# Patient Record
Sex: FEMALE | Race: WHITE | NOT HISPANIC OR LATINO | Employment: OTHER | URBAN - METROPOLITAN AREA
[De-identification: names, ages, dates, MRNs, and addresses within clinical notes are randomized per-mention and may not be internally consistent; named-entity substitution may affect disease eponyms.]

---

## 2017-11-24 ENCOUNTER — RECORDS - HEALTHEAST (OUTPATIENT)
Dept: LAB | Facility: CLINIC | Age: 67
End: 2017-11-24

## 2017-11-24 LAB
CHOLEST SERPL-MCNC: 209 MG/DL
FASTING STATUS PATIENT QL REPORTED: ABNORMAL
HDLC SERPL-MCNC: 59 MG/DL
LDLC SERPL CALC-MCNC: 135 MG/DL
TRIGL SERPL-MCNC: 76 MG/DL

## 2018-06-29 ENCOUNTER — RECORDS - HEALTHEAST (OUTPATIENT)
Dept: LAB | Facility: CLINIC | Age: 68
End: 2018-06-29

## 2018-06-29 LAB
ALBUMIN SERPL-MCNC: 4 G/DL (ref 3.5–5)
ALP SERPL-CCNC: 71 U/L (ref 45–120)
ALT SERPL W P-5'-P-CCNC: 11 U/L (ref 0–45)
ANION GAP SERPL CALCULATED.3IONS-SCNC: 10 MMOL/L (ref 5–18)
AST SERPL W P-5'-P-CCNC: 12 U/L (ref 0–40)
BILIRUB SERPL-MCNC: 1.5 MG/DL (ref 0–1)
BUN SERPL-MCNC: 23 MG/DL (ref 8–22)
CALCIUM SERPL-MCNC: 10.1 MG/DL (ref 8.5–10.5)
CHLORIDE BLD-SCNC: 100 MMOL/L (ref 98–107)
CO2 SERPL-SCNC: 26 MMOL/L (ref 22–31)
CREAT SERPL-MCNC: 1.13 MG/DL (ref 0.6–1.1)
GFR SERPL CREATININE-BSD FRML MDRD: 48 ML/MIN/1.73M2
GLUCOSE BLD-MCNC: 143 MG/DL (ref 70–125)
POTASSIUM BLD-SCNC: 4.7 MMOL/L (ref 3.5–5)
PROT SERPL-MCNC: 7.2 G/DL (ref 6–8)
SODIUM SERPL-SCNC: 136 MMOL/L (ref 136–145)

## 2018-07-01 LAB — BACTERIA SPEC CULT: ABNORMAL

## 2019-09-03 ENCOUNTER — RECORDS - HEALTHEAST (OUTPATIENT)
Dept: LAB | Facility: CLINIC | Age: 69
End: 2019-09-03

## 2019-09-03 LAB
ALBUMIN SERPL-MCNC: 4 G/DL (ref 3.5–5)
ALP SERPL-CCNC: 65 U/L (ref 45–120)
ALT SERPL W P-5'-P-CCNC: <9 U/L (ref 0–45)
ANION GAP SERPL CALCULATED.3IONS-SCNC: 11 MMOL/L (ref 5–18)
AST SERPL W P-5'-P-CCNC: 11 U/L (ref 0–40)
BASOPHILS # BLD AUTO: 0.1 THOU/UL (ref 0–0.2)
BASOPHILS NFR BLD AUTO: 1 % (ref 0–2)
BILIRUB SERPL-MCNC: 0.9 MG/DL (ref 0–1)
BUN SERPL-MCNC: 25 MG/DL (ref 8–28)
CALCIUM SERPL-MCNC: 10.2 MG/DL (ref 8.5–10.5)
CHLORIDE BLD-SCNC: 102 MMOL/L (ref 98–107)
CHOLEST SERPL-MCNC: 257 MG/DL
CO2 SERPL-SCNC: 24 MMOL/L (ref 22–31)
CREAT SERPL-MCNC: 1.19 MG/DL (ref 0.6–1.1)
CREAT UR-MCNC: 216.8 MG/DL
EOSINOPHIL # BLD AUTO: 0.2 THOU/UL (ref 0–0.4)
EOSINOPHIL NFR BLD AUTO: 2 % (ref 0–6)
ERYTHROCYTE [DISTWIDTH] IN BLOOD BY AUTOMATED COUNT: 13 % (ref 11–14.5)
FASTING STATUS PATIENT QL REPORTED: NO
GFR SERPL CREATININE-BSD FRML MDRD: 45 ML/MIN/1.73M2
GLUCOSE BLD-MCNC: 164 MG/DL (ref 70–125)
HCT VFR BLD AUTO: 36.3 % (ref 35–47)
HDLC SERPL-MCNC: 50 MG/DL
HGB BLD-MCNC: 11.9 G/DL (ref 12–16)
LDLC SERPL CALC-MCNC: 179 MG/DL
LYMPHOCYTES # BLD AUTO: 1.3 THOU/UL (ref 0.8–4.4)
LYMPHOCYTES NFR BLD AUTO: 20 % (ref 20–40)
MCH RBC QN AUTO: 33.1 PG (ref 27–34)
MCHC RBC AUTO-ENTMCNC: 32.8 G/DL (ref 32–36)
MCV RBC AUTO: 101 FL (ref 80–100)
MICROALBUMIN UR-MCNC: 52.42 MG/DL (ref 0–1.99)
MICROALBUMIN/CREAT UR: 241.8 MG/G
MONOCYTES # BLD AUTO: 0.3 THOU/UL (ref 0–0.9)
MONOCYTES NFR BLD AUTO: 5 % (ref 2–10)
NEUTROPHILS # BLD AUTO: 4.5 THOU/UL (ref 2–7.7)
NEUTROPHILS NFR BLD AUTO: 71 % (ref 50–70)
PLATELET # BLD AUTO: 312 THOU/UL (ref 140–440)
PMV BLD AUTO: 9.8 FL (ref 8.5–12.5)
POTASSIUM BLD-SCNC: 4.4 MMOL/L (ref 3.5–5)
PROT SERPL-MCNC: 7 G/DL (ref 6–8)
RBC # BLD AUTO: 3.59 MILL/UL (ref 3.8–5.4)
SODIUM SERPL-SCNC: 137 MMOL/L (ref 136–145)
TRIGL SERPL-MCNC: 139 MG/DL
WBC: 6.4 THOU/UL (ref 4–11)

## 2019-09-05 LAB — BACTERIA SPEC CULT: ABNORMAL

## 2020-01-10 ENCOUNTER — HOSPITAL ENCOUNTER (OUTPATIENT)
Dept: MAMMOGRAPHY | Facility: CLINIC | Age: 70
Discharge: HOME | End: 2020-01-10

## 2020-01-10 DIAGNOSIS — Z12.31 SCREENING MAMMOGRAM, ENCOUNTER FOR: ICD-10-CM

## 2020-01-13 ENCOUNTER — RECORDS - HEALTHEAST (OUTPATIENT)
Dept: ADMINISTRATIVE | Facility: OTHER | Age: 70
End: 2020-01-13

## 2020-01-16 ENCOUNTER — HOSPITAL ENCOUNTER (OUTPATIENT)
Dept: MAMMOGRAPHY | Facility: CLINIC | Age: 70
Discharge: HOME | End: 2020-01-16

## 2020-01-16 DIAGNOSIS — R92.0 BREAST MICROCALCIFICATIONS: ICD-10-CM

## 2020-03-31 ENCOUNTER — RECORDS - HEALTHEAST (OUTPATIENT)
Dept: LAB | Facility: CLINIC | Age: 70
End: 2020-03-31

## 2020-03-31 LAB
CHOLEST SERPL-MCNC: 162 MG/DL
FASTING STATUS PATIENT QL REPORTED: ABNORMAL
HDLC SERPL-MCNC: 47 MG/DL
LDLC SERPL CALC-MCNC: 90 MG/DL
TRIGL SERPL-MCNC: 127 MG/DL

## 2021-03-30 ENCOUNTER — RECORDS - HEALTHEAST (OUTPATIENT)
Dept: LAB | Facility: CLINIC | Age: 71
End: 2021-03-30

## 2021-03-30 LAB
ALBUMIN SERPL-MCNC: 4.1 G/DL (ref 3.5–5)
ALP SERPL-CCNC: 73 U/L (ref 45–120)
ALT SERPL W P-5'-P-CCNC: <9 U/L (ref 0–45)
ANION GAP SERPL CALCULATED.3IONS-SCNC: 10 MMOL/L (ref 5–18)
AST SERPL W P-5'-P-CCNC: 11 U/L (ref 0–40)
BILIRUB SERPL-MCNC: 1.4 MG/DL (ref 0–1)
BUN SERPL-MCNC: 25 MG/DL (ref 8–28)
CALCIUM SERPL-MCNC: 10 MG/DL (ref 8.5–10.5)
CHLORIDE BLD-SCNC: 99 MMOL/L (ref 98–107)
CHOLEST SERPL-MCNC: 117 MG/DL
CO2 SERPL-SCNC: 26 MMOL/L (ref 22–31)
CREAT SERPL-MCNC: 1.26 MG/DL (ref 0.6–1.1)
FASTING STATUS PATIENT QL REPORTED: ABNORMAL
GFR SERPL CREATININE-BSD FRML MDRD: 42 ML/MIN/1.73M2
GLUCOSE BLD-MCNC: 148 MG/DL (ref 70–125)
HDLC SERPL-MCNC: 45 MG/DL
LDLC SERPL CALC-MCNC: 51 MG/DL
POTASSIUM BLD-SCNC: 4.5 MMOL/L (ref 3.5–5)
PROT SERPL-MCNC: 7.3 G/DL (ref 6–8)
SODIUM SERPL-SCNC: 135 MMOL/L (ref 136–145)
TRIGL SERPL-MCNC: 103 MG/DL

## 2021-03-30 ASSESSMENT — MIFFLIN-ST. JEOR: SCORE: 1369.54

## 2021-05-31 ENCOUNTER — RECORDS - HEALTHEAST (OUTPATIENT)
Dept: ADMINISTRATIVE | Facility: CLINIC | Age: 71
End: 2021-05-31

## 2021-06-20 ENCOUNTER — AMBULATORY - HEALTHEAST (OUTPATIENT)
Dept: PULMONOLOGY | Facility: OTHER | Age: 71
End: 2021-06-20

## 2021-06-20 DIAGNOSIS — I26.02 ACUTE SADDLE PULMONARY EMBOLISM WITH ACUTE COR PULMONALE (H): ICD-10-CM

## 2021-06-24 ENCOUNTER — VIRTUAL VISIT (OUTPATIENT)
Dept: PHARMACY | Facility: PHYSICIAN GROUP | Age: 71
End: 2021-06-24

## 2021-06-24 DIAGNOSIS — I26.02 ACUTE SADDLE PULMONARY EMBOLISM WITH ACUTE COR PULMONALE (H): Primary | ICD-10-CM

## 2021-06-24 DIAGNOSIS — F33.42 MAJOR DEPRESSIVE DISORDER, RECURRENT EPISODE, IN FULL REMISSION (H): ICD-10-CM

## 2021-06-24 DIAGNOSIS — R32 URINARY INCONTINENCE, UNSPECIFIED TYPE: ICD-10-CM

## 2021-06-24 DIAGNOSIS — I10 ESSENTIAL HYPERTENSION: ICD-10-CM

## 2021-06-24 DIAGNOSIS — M81.0 AGE-RELATED OSTEOPOROSIS WITHOUT CURRENT PATHOLOGICAL FRACTURE: ICD-10-CM

## 2021-06-24 DIAGNOSIS — E11.9 TYPE 2 DIABETES MELLITUS WITHOUT COMPLICATION, WITHOUT LONG-TERM CURRENT USE OF INSULIN (H): ICD-10-CM

## 2021-06-24 PROCEDURE — 99605 MTMS BY PHARM NP 15 MIN: CPT | Performed by: PHARMACIST

## 2021-06-24 PROCEDURE — 99607 MTMS BY PHARM ADDL 15 MIN: CPT | Performed by: PHARMACIST

## 2021-06-24 NOTE — PROGRESS NOTES
Medication Therapy Management (MTM) Encounter    ASSESSMENT:                            Medication Adherence/Access: No issues identified    Pulmonary embolism, Deep vein thromboisis: INR prior to discharge was subtherapeutic, plan to recheck tomorrow. Enoxaparin should be continued until INR is above 2.0. Reviewed importance of consistent diet and to discuss any medication changes with provider.     Urinary incontinence: Unclear why oxybutynin dose was reduced during hospitalization. Safety concerns with anticholinergic effects of oxybuytnin on memory, cognition, and dry mouth may be a rationale. She may benefit from switching immediate-release oxybuytnin to extended-release form at same dose to see if that provides longer effect without requiring twice a day dosing. Also may consider trial off hydrochlorothiazide to reduce diuretic-induced urinary incontinence.      Type 2 Diabetes: Patient is meeting A1c goal of < 7%. Self monitoring of blood glucose is at goal of fasting  mg/dL and post prandial < 180 mg/dL. Reasonable to continue metformin monotherapy for now.     Hypertension: Patient is meeting blood pressure goal of < 130/80mmHg. She may benefit from switching combination lisinopril to lisinopril alone and restarting low dose amlodipine to minimize medication-related urinary incontinence.     Depression: For patients >60 years of age, the maximum recommended citalopram dose is 20 mg/day due to increased exposure and the risk of QT prolongation. She does not appear to have tried lower dose, may consider decreasing dose in the future to see if effect is the same with lower risk for side effects.     Osteoporosis: Due for repeat DEXA scan. Duration of bisphosphonate therapy at treatment dose is 3 years, she may benefit from an additional 2 years of therapy before consider bisphosphonate holiday. She would likely benefit from calcium-vitamin D supplementation but has limitations due to cost.     PLAN:                           For patient:  1. INR check tomorrow. If INR is above 2.0 enoxaparin shots can be stopped.    2. I will talk with your provider about changing oxybutynin to long-acting form at current 5 mg daily dose  3. Recheck blood pressure at provider appt tomorrow. I will suggest your doctor change your combination lisinopril-hydrochlorothiazide pill to lisinopril alone to see if that helps decrease how often you need to urinate.   4. You are due for a bone density scan    For provider:   1. Consider changing oxybutynin to oxybutynin ER 5 mg one daily  2. Stop Lovenox if INR above 2.0.  3. If blood pressure is stable, consider changing lisinopril-hydrochlorothiazide to lisinopril alone and adding back amlodipine to reduce frequent urination.   4. Due for DEXA scan    Follow-up: Return in about 4 weeks (around 7/22/2021) for post-hospital medication therapy manageent follow-up with me using a phone visit.      SUBJECTIVE/OBJECTIVE:                          Laurel Darden is a 71 year old female called for a transitions of care visit. She was discharged from [facility] on 6/21/2021 for acute saddle pulmonary embolism status post thrombectomy. Patient was accompanied by her daughter, Jasmyn, during our phone visit.     Reason for visit: Hospital follow-up medication review and management. She is concerned about why oxybutynin pill was changed in the hospital.     Allergies/ADRs: Reviewed in chart  Tobacco: She reports that she has quit smoking. She has never used smokeless tobacco.  Alcohol: none  Caffeine: no caffeine  Past Medical History: Reviewed in chart      Discharge Medications with Med changes:  START taking these medications    enoxaparin ANTICOAGULANT 100 mg/mL Syrg  Quantity: 10 mL  1 mg/kg (100 mg), Subcutaneous, 2 times daily   metFORMIN 500 MG 24 hr tablet  Quantity: 60 tablet  1,000 mg, Oral, Daily with breakfast  Replaces: metFORMIN 1000 MG tablet   warfarin ANTICOAGULANT 7.5 MG  tablet  Quantity: 30 tablet  Take 1 tablets (7.5 mg) by mouth daily. Adjust dose based on INR results as directed.       CONTINUE taking these medications    alendronate 70 MG tablet, 70 mg, Oral, Weekly, Fridays- not sure    atorvastatin 80 MG tablet, 80 mg, Oral, Bedtime   citalopram 40 MG tablet, 40 mg, Oral, DAILY   lisinopriL-hydrochlorothiazide 20-12.5 mg per tablet, 1 tablet, Oral, DAILY   oxybutynin 5 MG tablet, 5 mg, Oral, DAILY- reports taking two times daily prior to admission       STOP taking these medications    amLODIPine 10 MG tablet   aspirin 325 MG tablet   glipiZIDE 5 MG tablet   metFORMIN 1000 MG tablet  Commonly known as: GLUCOPHAGE  Replaced by: metFORMIN 500 MG 24 hr tablet       Medication Adherence/Access: Patient is currently receiving assistance with pillbox setup and medication management from home care RN.     Pulmonary embolism, Deep vein thromboisis:   Current medications:  Enoxaparin 100 mg/mL, inject 1 mL (1 mg/kg) two times a day  Warfarin 7.5 mg once daily  Patient has been doing enoxaparin injections since discharge without any issues. She also is taking warfarin every evening and confirms taking 7.5 mg tablet. Source of PE was suspected to be right leg nonocculusive DVT seen on ultrasound. She underwent embolectomy and was transitioned to enoxaparin/warfarin before discharge. She does still have some bruising from the anticoagulation from embolectomy but isn't worsening with enoxaparin shots. She denies any blood in her stool or urine. She was discharged with 10 enoxaparin syringes. She has a hospital follow-up appointment with her provider tomorrow and will have INR checked at that time.    INR prior to discharge: 6/21/21- 1.13  Hemoglobin prior to discharge: 6/19/21- 10.1 g/dL    Urinary incontinence:   Current medications: oxybutynin 5 mg once daily  Prior to hospitalization she was taking oxybutynin 5 mg twice a day and she is unsure why this was changed in the hospital. She  does find this medication helpful for urinary urgency and frequency.     Type 2 Diabetes:   Diabetes Medication(s)     Biguanides       metFORMIN (GLUCOPHAGE-XR) 500 MG 24 hr tablet    Take 1,000 mg by mouth daily (with breakfast)        She is taking metformin two tablets with breakfast. Her form of metformin was changed from immediate-release to extended-release to see if that would help decrease diarrhea and stomach side effects. Glipizide was stopped at discharge. She has been on insulin the past but wasn't taking consistently due to high cost. Patient is not experiencing side effects.  Blood sugar monitoring: 3-4 time(s) daily. Ranges (patient reported): checked prior to visit while eating breakfast and it was 148  Diet/Exercise: She is typically eating 3 meals a day, she does have an appetite   Symptoms of low blood sugar? none  Symptoms of high blood sugar? none  Eye exam: up to date; Foot exam: up to date  Aspirin: Not taking due to warfarin now prescribed, aspirin stopped at discharge  Statin: Yes: atorvastatin 80 mg once daily   ACEi/ARB: Yes: lisinopril-hydrochlorothiazide 20-12.5 mg daily     Glycosylated Hemoglobin A1C Resulted: 6/19/2021 6:15 AM [facility]  Component Name Value Ref Range   Hemoglobin A1c 6.9 (H) <=5.6 %       Hypertension:   Current medications: lisinopril-hydrochlorothiazide 20-12.5 mg once daily.    Patient has blood pressure monitored by home care. She doesn't remember her readings yesterday or since discharge but reports they have been good.  Patient reports the following medication side effects: excessive urination.    Basic Metabolic Panel Resulted: 6/20/2021 7:53 AM [facility]  Component Name Value Ref Range   Sodium 135 (L) 136 - 145 mmol/L   Potassium 4.4 3.5 - 5.0 mmol/L   Chloride 103 98 - 107 mmol/L   CO2 26 22 - 31 mmol/L   Anion Gap, Calculation 6 5 - 18 mmol/L   Glucose 123 70 - 125 mg/dL   Calcium 9.4 8.5 - 10.5 mg/dL   BUN 17 8 - 28 mg/dL  "  Creatinine 0.98 0.60 - 1.10 mg/dL   GFR MDRD Af Amer >60 >60 mL/min/1.73m2   GFR MDRD Non Af Amer 56 (L) >60 mL/min/1.73m2       Depression:   Current medication: citalopram 40 mg once daily  She does find this medication helpful for her mood. She does not think it is causing any side effects. Per chart review she was started at 40 mg dose in 2018 when she had to switch from Paxil. Never prescribed lower dose.     QTc calculated 6/21/2021- 466 ms     PHQ 5/21/2021   PHQ-9 Total Score 5   Q9: Thoughts of better off dead/self-harm past 2 weeks Not at all       Osteoporosis:   Current therapy: alendronate (Fosamax) 70mg weekly   She isn't sure how long she has been taking this medicine. Per chart review she was started on prevention 35 mg weekly dose 5/2014 and switched to 70 mg dose 8/2018. Patient is not experiencing side effects. She is not taking supplements but does think she gets calcium in her diet.   DEXA History: 7/20/2018, left femur neck T-score -2.7  Risk factors: post-menopausal  Last vitamin D level: None in chart        Last Filed Vital Signs- prior to discharge  Vital Sign Reading Time Taken   Blood Pressure 124/65 06/21/2021 11:21 AM CDT   Pulse 79 06/21/2021 11:21 AM CDT   Temperature 37  C (98.6  F) 06/21/2021 11:21 AM CDT   Respiratory Rate 18 06/21/2021 11:21 AM CDT   Oxygen Saturation 94% 06/21/2021 11:21 AM CDT   Inhaled Oxygen Concentration - -   Weight 96.9 kg (213 lb 11.2 oz) 06/19/2021 2:40 PM CDT   Height 157.5 cm (5' 2\") 06/19/2021 2:40 PM CDT   Body Mass Index 39.09 06/19/2021 2:40 PM CDT       Last Clinic Vitals: 3/30/2021- /72 (BP Location: Left arm, Patient Position: Sitting, Cuff Size: Adult Regular)   Pulse 82   Ht 5' 2\" (1.575 m)   Wt 199 lb 12.8 oz (90.6 kg)   BMI 36.54 kg/m    ----------------  Post Discharge Medication Reconciliation Status: discharge medications reconciled and changed, per note/orders.    I spent 17 minutes with this patient today. I offer these " suggestions for consideration by Tosha Henderson PA-C. A copy of the visit note was provided to the patient's primary care provider.    The patient will be given at appointment in clinic with provider tomorrow a summary of these recommendations.     Gianna Keyes, PharmD, [facility]  Medication Therapy Management Pharmacist  [facility]  Pager: [phone]      Telemedicine Visit Details  Type of service:  Telephone visit  Start Time: 10:12 AM  End Time: 10:29 AM  Originating Location (patient location): [place]  Distant Location (provider location):  [facility]        Medication Therapy Recommendations  Essential hypertension    Current Medication: lisinopril-hydrochlorothiazide (ZESTORETIC) 20-12.5 MG tablet   Rationale: Undesirable effect - Adverse medication event - Safety   Recommendation: Change Medication - lisinopril 20 MG tablet - Consider changing lisinopril-hydrochlorothiazide to lisinopril alone and adding back amlodipine to reduce urinary frequency.   Status: Contact Provider - Awaiting Response         Urinary incontinence, unspecified type    Current Medication: oxybutynin (DITROPAN) 5 MG tablet   Rationale: Dose too low - Dosage too low - Effectiveness   Recommendation: Change Medication Formulation  - oxybutynin ER 5 MG 24 hr tablet - Consider changing oxybutynin to long-acting form at current 5 mg daily dose   Status: Contact Provider - Awaiting Response

## 2021-06-25 ENCOUNTER — RECORDS - HEALTHEAST (OUTPATIENT)
Dept: LAB | Facility: CLINIC | Age: 71
End: 2021-06-25

## 2021-06-25 LAB
ANION GAP SERPL CALCULATED.3IONS-SCNC: 15 MMOL/L (ref 5–18)
BUN SERPL-MCNC: 13 MG/DL (ref 8–28)
CALCIUM SERPL-MCNC: 8.6 MG/DL (ref 8.5–10.5)
CHLORIDE BLD-SCNC: 102 MMOL/L (ref 98–107)
CO2 SERPL-SCNC: 21 MMOL/L (ref 22–31)
CREAT SERPL-MCNC: 1.06 MG/DL (ref 0.6–1.1)
FOLATE SERPL-MCNC: 5.5 NG/ML
GFR SERPL CREATININE-BSD FRML MDRD: 51 ML/MIN/1.73M2
GLUCOSE BLD-MCNC: 167 MG/DL (ref 70–125)
POTASSIUM BLD-SCNC: 3.8 MMOL/L (ref 3.5–5)
SODIUM SERPL-SCNC: 138 MMOL/L (ref 136–145)
VIT B12 SERPL-MCNC: 146 PG/ML (ref 213–816)

## 2021-06-25 ASSESSMENT — MIFFLIN-ST. JEOR: SCORE: 1361.37

## 2021-06-29 ENCOUNTER — RECORDS - HEALTHEAST (OUTPATIENT)
Dept: LAB | Facility: HOSPITAL | Age: 71
End: 2021-06-29

## 2021-06-29 VITALS
BODY MASS INDEX: 36.44 KG/M2 | DIASTOLIC BLOOD PRESSURE: 60 MMHG | WEIGHT: 198 LBS | HEART RATE: 80 BPM | HEIGHT: 62 IN | SYSTOLIC BLOOD PRESSURE: 114 MMHG

## 2021-06-29 LAB
ANION GAP SERPL CALCULATED.3IONS-SCNC: 13 MMOL/L (ref 5–18)
BASOPHILS # BLD AUTO: 0.1 THOU/UL (ref 0–0.2)
BASOPHILS NFR BLD AUTO: 1 % (ref 0–2)
BUN SERPL-MCNC: 14 MG/DL (ref 8–28)
CALCIUM SERPL-MCNC: 9.6 MG/DL (ref 8.5–10.5)
CHLORIDE BLD-SCNC: 101 MMOL/L (ref 98–107)
CO2 SERPL-SCNC: 25 MMOL/L (ref 22–31)
CREAT SERPL-MCNC: 0.79 MG/DL (ref 0.6–1.1)
EOSINOPHIL # BLD AUTO: 0.3 THOU/UL (ref 0–0.4)
EOSINOPHIL NFR BLD AUTO: 4 % (ref 0–6)
ERYTHROCYTE [DISTWIDTH] IN BLOOD BY AUTOMATED COUNT: 14 % (ref 11–14.5)
GFR SERPL CREATININE-BSD FRML MDRD: >60 ML/MIN/1.73M2
GLUCOSE BLD-MCNC: 107 MG/DL (ref 70–125)
HCT VFR BLD AUTO: 27.9 % (ref 35–47)
HGB BLD-MCNC: 9.1 G/DL (ref 12–16)
IMM GRANULOCYTES # BLD: 0.1 THOU/UL
IMM GRANULOCYTES NFR BLD: 1 %
LYMPHOCYTES # BLD AUTO: 1.8 THOU/UL (ref 0.8–4.4)
LYMPHOCYTES NFR BLD AUTO: 23 % (ref 20–40)
MCH RBC QN AUTO: 33.6 PG (ref 27–34)
MCHC RBC AUTO-ENTMCNC: 32.6 G/DL (ref 32–36)
MCV RBC AUTO: 103 FL (ref 80–100)
MONOCYTES # BLD AUTO: 0.4 THOU/UL (ref 0–0.9)
MONOCYTES NFR BLD AUTO: 5 % (ref 2–10)
NEUTROPHILS # BLD AUTO: 5.1 THOU/UL (ref 2–7.7)
NEUTROPHILS NFR BLD AUTO: 66 % (ref 50–70)
PLATELET # BLD AUTO: 347 THOU/UL (ref 140–440)
PMV BLD AUTO: 8.6 FL (ref 8.5–12.5)
POTASSIUM BLD-SCNC: 4.1 MMOL/L (ref 3.5–5)
RBC # BLD AUTO: 2.71 MILL/UL (ref 3.8–5.4)
SODIUM SERPL-SCNC: 139 MMOL/L (ref 136–145)
WBC: 7.7 THOU/UL (ref 4–11)

## 2021-06-29 RX ORDER — ATORVASTATIN CALCIUM 80 MG/1
80 TABLET, FILM COATED ORAL EVERY EVENING
COMMUNITY
Start: 2021-05-12 | End: 2021-08-12

## 2021-06-29 RX ORDER — BLOOD SUGAR DIAGNOSTIC
STRIP MISCELLANEOUS
COMMUNITY
Start: 2020-03-31

## 2021-06-29 RX ORDER — LISINOPRIL AND HYDROCHLOROTHIAZIDE 12.5; 2 MG/1; MG/1
1 TABLET ORAL DAILY
COMMUNITY
End: 2021-08-12

## 2021-06-29 RX ORDER — ALENDRONATE SODIUM 70 MG/1
70 TABLET ORAL WEEKLY
COMMUNITY
Start: 2018-08-03 | End: 2023-06-26

## 2021-06-29 RX ORDER — OXYBUTYNIN CHLORIDE 5 MG/1
5 TABLET ORAL DAILY
COMMUNITY
End: 2023-06-26

## 2021-06-29 RX ORDER — CITALOPRAM HYDROBROMIDE 40 MG/1
40 TABLET ORAL DAILY
COMMUNITY
Start: 2021-05-11 | End: 2021-08-12

## 2021-06-29 RX ORDER — METFORMIN HCL 500 MG
1000 TABLET, EXTENDED RELEASE 24 HR ORAL
COMMUNITY
Start: 2021-06-21 | End: 2021-08-12

## 2021-06-29 RX ORDER — WARFARIN SODIUM 7.5 MG/1
TABLET ORAL DAILY
COMMUNITY
Start: 2021-06-21 | End: 2023-06-26 | Stop reason: DRUGHIGH

## 2021-06-29 ASSESSMENT — PATIENT HEALTH QUESTIONNAIRE - PHQ9: SUM OF ALL RESPONSES TO PHQ QUESTIONS 1-9: 5

## 2021-06-29 NOTE — PATIENT INSTRUCTIONS
Recommendations from today's MTM visit:                                                    MTM (medication therapy management) is a service provided by a clinical pharmacist designed to help you get the most of out of your medicines.   Today we reviewed what your medicines are for, how to know if they are working, that your medicines are safe and how to make your medicine regimen as easy as possible.      1. INR check tomorrow. If INR is above 2.0 enoxaparin shots can be stopped.    2. I will talk with your provider about changing oxybutynin to long-acting form at current 5 mg daily dose  3. Recheck blood pressure at provider appt tomorrow. I will suggest your doctor change your combination lisinopril-hydrochlorothiazide pill to lisinopril alone to see if that helps decrease how often you need to urinate.   4. You are due for a bone density scan    Follow-up: Return in about 4 weeks (around 7/22/2021) for post-hospital medication therapy manageent follow-up with me using a phone visit.    It was great to speak with you today.  I value your experience and would be very thankful for your time with providing feedback on our clinic survey. You may receive a survey via email or text message in the next few days.     To schedule another MTM appointment, please call the clinic directly or you may call the MTM scheduling line at [phone] or toll-free at [phone].     My Clinical Pharmacist's contact information:                                                      Please feel free to contact me with any questions or concerns you have.      Gianna Keyes, Rafa, [facility]  Medication Therapy Management Pharmacist  [facility]  Pager: [phone]               Medication List          Accurate as of June 25, 2021 11:59 PM. If you have any questions, ask your nurse or doctor.           Morning Evening   Accu-Chek Lori Plus test strip  Use as directed to test blood sugar 4 times a day or as directed  Reason  for med: Type 2 Diabetes            alendronate 70 MG tablet  Also known as: FOSAMAX  Take 70 mg by mouth once a week (on Friday)  Reason for med: Osteoporosis      1 tablet   On Fridays      atorvastatin 80 MG tablet  Also known as: LIPITOR  Take 80 mg by mouth every evening  Reason for med: High Cholesterol, Prevent a Stroke and Heart Disease         1 tablet   citalopram 40 MG tablet  Also known as: celeXA  Take 40 mg by mouth daily  Reason for med: Mood         1 tablet   lisinopril-hydrochlorothiazide 20-12.5 MG tablet  Also known as: ZESTORETIC  Take 1 tablet by mouth daily  Reason for med: High Blood Pressure     1 tablet      metFORMIN 500 MG 24 hr tablet  Also known as: GLUCOPHAGE-XR  Take 1,000 mg by mouth daily (with breakfast)  Reason for med: Type 2 Diabetes      2 tablets      oxybutynin 5 MG tablet  Also known as: DITROPAN  Take 5 mg by mouth daily  Reason for med: Overactive Bladder, Frequent Urination           1 tablet   warfarin ANTICOAGULANT 7.5 MG tablet  Also known as: COUMADIN  Take by mouth daily Dose as instructed based on INR.  Current dose: one-half tablet (3.75 mg) on Monday and Friday, one tablet (7.5 mg) all other days of the week.  Reason for med: Blockage of Blood Vessel to Lung by a Blood Clot, Blood Clot in Veins         Current Dose:  1/2 tablet Monday and Friday    1 tablet  Sunday, Tuesday, Wednesday, Thursday, Saturday

## 2021-07-02 ENCOUNTER — TRANSFERRED RECORDS (OUTPATIENT)
Dept: HEALTH INFORMATION MANAGEMENT | Facility: CLINIC | Age: 71
End: 2021-07-02

## 2021-07-02 LAB — HBA1C MFR BLD: 6.4 % (ref 4.2–6.1)

## 2021-07-05 PROBLEM — Z66 DNR (DO NOT RESUSCITATE): Status: ACTIVE | Noted: 2021-06-21

## 2021-07-05 PROBLEM — E78.2 MIXED HYPERLIPIDEMIA: Status: ACTIVE | Noted: 2021-06-18

## 2021-07-05 PROBLEM — N18.31 CHRONIC KIDNEY DISEASE, STAGE 3A (H): Status: ACTIVE | Noted: 2021-06-18

## 2021-07-05 PROBLEM — I26.09 ACUTE PULMONARY EMBOLISM WITH ACUTE COR PULMONALE, UNSPECIFIED PULMONARY EMBOLISM TYPE (H): Status: ACTIVE | Noted: 2021-06-18

## 2021-07-05 PROBLEM — F33.42 RECURRENT MAJOR DEPRESSION IN FULL REMISSION (H): Status: ACTIVE | Noted: 2021-06-18

## 2021-07-05 PROBLEM — G47.33 OSA (OBSTRUCTIVE SLEEP APNEA): Status: ACTIVE | Noted: 2021-06-18

## 2021-07-05 PROBLEM — J96.10 CHRONIC RESPIRATORY FAILURE (H): Status: ACTIVE | Noted: 2021-06-20

## 2021-07-05 PROBLEM — E66.9 OBESITY: Status: ACTIVE | Noted: 2021-06-18

## 2021-07-05 PROBLEM — M81.0 AGE RELATED OSTEOPOROSIS: Status: ACTIVE | Noted: 2021-06-18

## 2021-08-05 ENCOUNTER — HOSPITAL ENCOUNTER (OUTPATIENT)
Dept: CARDIOLOGY | Facility: HOSPITAL | Age: 71
Discharge: HOME | End: 2021-08-05
Attending: INTERNAL MEDICINE | Admitting: INTERNAL MEDICINE
Payer: COMMERCIAL

## 2021-08-05 DIAGNOSIS — I26.02 ACUTE SADDLE PULMONARY EMBOLISM WITH ACUTE COR PULMONALE (H): ICD-10-CM

## 2021-08-05 LAB — LVEF ECHO: NORMAL

## 2021-08-05 PROCEDURE — 93306 TTE W/DOPPLER COMPLETE: CPT | Mod: 26 | Performed by: INTERNAL MEDICINE

## 2021-08-05 PROCEDURE — 255N000002 HC RX 255 OP 636: Performed by: INTERNAL MEDICINE

## 2021-08-05 RX ADMIN — PERFLUTREN 2 ML: 6.52 INJECTION, SUSPENSION INTRAVENOUS at 11:50

## 2021-08-12 ENCOUNTER — OFFICE VISIT (OUTPATIENT)
Dept: PULMONOLOGY | Facility: OTHER | Age: 71
End: 2021-08-12
Payer: COMMERCIAL

## 2021-08-12 VITALS
DIASTOLIC BLOOD PRESSURE: 62 MMHG | BODY MASS INDEX: 37.54 KG/M2 | HEIGHT: 62 IN | HEART RATE: 93 BPM | SYSTOLIC BLOOD PRESSURE: 120 MMHG | WEIGHT: 204 LBS | OXYGEN SATURATION: 96 %

## 2021-08-12 DIAGNOSIS — I82.451 ACUTE DEEP VEIN THROMBOSIS (DVT) OF RIGHT PERONEAL VEIN (H): ICD-10-CM

## 2021-08-12 DIAGNOSIS — E66.812 CLASS 2 OBESITY DUE TO EXCESS CALORIES WITH BODY MASS INDEX (BMI) OF 37.0 TO 37.9 IN ADULT, UNSPECIFIED WHETHER SERIOUS COMORBIDITY PRESENT: ICD-10-CM

## 2021-08-12 DIAGNOSIS — R53.81 PHYSICAL DECONDITIONING: ICD-10-CM

## 2021-08-12 DIAGNOSIS — R06.09 DYSPNEA ON EXERTION: ICD-10-CM

## 2021-08-12 DIAGNOSIS — E66.09 CLASS 2 OBESITY DUE TO EXCESS CALORIES WITH BODY MASS INDEX (BMI) OF 37.0 TO 37.9 IN ADULT, UNSPECIFIED WHETHER SERIOUS COMORBIDITY PRESENT: ICD-10-CM

## 2021-08-12 DIAGNOSIS — G47.33 OSA (OBSTRUCTIVE SLEEP APNEA): ICD-10-CM

## 2021-08-12 DIAGNOSIS — I26.09 ACUTE PULMONARY EMBOLISM WITH ACUTE COR PULMONALE, UNSPECIFIED PULMONARY EMBOLISM TYPE (H): Primary | ICD-10-CM

## 2021-08-12 PROCEDURE — 99214 OFFICE O/P EST MOD 30 MIN: CPT | Mod: 25 | Performed by: INTERNAL MEDICINE

## 2021-08-12 PROCEDURE — 94760 N-INVAS EAR/PLS OXIMETRY 1: CPT | Performed by: INTERNAL MEDICINE

## 2021-08-12 RX ORDER — LISINOPRIL 20 MG/1
20 TABLET ORAL DAILY
COMMUNITY

## 2021-08-12 ASSESSMENT — MIFFLIN-ST. JEOR: SCORE: 1388.59

## 2021-08-12 NOTE — NURSING NOTE
Oxygen saturation walk test    Patient oxygen saturation on RA at rest is 96%.  Oxygen saturation while ambulating 300ft on RA is 93%.      Patient is ambulatory within his/her home.

## 2021-08-12 NOTE — PROGRESS NOTES
PULMONARY OUTPATIENT CONSULT NOTE        Assessment:      1. Pulmonary Embolism and RLE DVT 6/18/2021  CT scan showed bilateral pulmonary embolism and RV strain.   Venous US LEs showed non occlusive popliteal DVT.   S/p pulmonary angiogram and mechanical thrombectomy 6/18, improvement of pulmonary artery pressures post procedure.   Patient has history of DVT x 2 in the past.   Sedentary life plays a role.   Long term anticoagulation.  Follow up echocardiogram 8/5/21 showed normal EF and normal RV size and function.      No significant desaturation with activities. No need for O2 supplementation at rest or with activities.   2. LALO poor complaint with CPAP  [place] sleepiness scale 14/24.   Referral to sleep clinic.   3. Physical deconditioning  4. Obesity Body mass index is 37.31 kg/m .     Plan:      1. No need for O2 supplementation at rest or with activities.  2. Long term anticoagulation with coumadin  3. Sleep hygiene   4. Increase activity level   5. Contact me if you change your mind regarding referral to physical therapy   6. Referral to sleep clinic   7. Follow up in 6 months         Gio Polo  Pulmonary / Critical Care  August 12, 2021        CC:     F/u post hospitalization      HPI:      Laurel Darden is a 71 y.o. female with history of HTN, CVA at the age of 48, obstructive sleep apnea not compliant on CPAP, DM poorly control, CKD stage IIIa, osteoporosis, and history of venothromboembolic disease x 2, previous tobacco use.   Recent hospitalization at [facility] for evaluation of progressive shortness of breath on 6/18/2021, patient was hypoxic to 86% on room air, CTA showed extensive pulmonary embolism, RV strain.   Echocardiogram showed EF 57%, poor visualization of right heart, abnormal function.   Patient underwent right and left thrombectomy, multiple aspirations. Pre-thrombectomy pulmonary artery pressure 59/31 (39), post thrombectomy pulmonary artery pressure   29/11 (19).   Patient was hemodynamically stable post procedure. Discharged home on O2 supplementation 2 LPM.   She was started on long term anticoagulation with coumadin.     Patient reports doing well, ongoing exertional dyspnea, able to walk over a block.   Denies cough, wheezes.   No chest pain, orthopnea, PND or swelling of LEs.   Denies fever, chills or night sweats.   Diagnosed with LALO, reports feeling tired and fatigue during the day, she is not using CPAP therapy.   Previous tobacco use 1/2 ppd for 30 years, quit 20 years ago.       Past Medical History :     Past Medical History:   Diagnosis Date     Age related osteoporosis 6/18/2021     Chronic kidney disease, stage 3a 6/18/2021     Essential hypertension 1/21/2008     H/O: CVA (cerebrovascular accident) 4/12/2010     Malignant melanoma of skin of upper limb, including shoulder (H) 5/8/2008     Mixed hyperlipidemia 6/18/2021     Obesity 6/18/2021     Recurrent major depression in full remission (H) 6/18/2021     Sleep apnea 6/18/2021     Type 2 diabetes mellitus without complication (H) 1/21/2008      Medications:       Current Outpatient Medications:      atorvastatin (LIPITOR) 80 MG tablet, [ATORVASTATIN (LIPITOR) 80 MG TABLET] Take 80 mg by mouth at bedtime., Disp: , Rfl:      blood glucose (ACCU-CHEK VIOLETTA PLUS) test strip, Use as directed to test blood sugar 4 times a day or as directed, Disp: , Rfl:      citalopram (CELEXA) 40 MG tablet, [CITALOPRAM (CELEXA) 40 MG TABLET] Take 40 mg by mouth daily., Disp: , Rfl:      lisinopril (ZESTRIL) 20 MG tablet, Take 20 mg by mouth daily, Disp: , Rfl:      oxybutynin (DITROPAN) 5 MG tablet, Take 5 mg by mouth daily , Disp: , Rfl:      oxybutynin (DITROPAN) 5 MG tablet, [OXYBUTYNIN (DITROPAN) 5 MG TABLET] Take 5 mg by mouth daily., Disp: , Rfl:      warfarin ANTICOAGULANT (COUMADIN) 7.5 MG tablet, Take by mouth daily Dose as instructed based on INR. Current dose: one-half tablet (3.75 mg) on Monday and  Friday, one tablet (7.5 mg) all other days of the week., Disp: , Rfl:      warfarin ANTICOAGULANT (COUMADIN/JANTOVEN) 7.5 MG tablet, [WARFARIN ANTICOAGULANT (COUMADIN/JANTOVEN) 7.5 MG TABLET] Take 1 tablets (7.5 mg) by mouth daily. Adjust dose based on INR results as directed., Disp: 30 tablet, Rfl: 0     alendronate (FOSAMAX) 70 MG tablet, [ALENDRONATE (FOSAMAX) 70 MG TABLET] Take 70 mg by mouth once a week. Fridays, Disp: , Rfl:      metFORMIN (GLUCOPHAGE-XR) 500 MG 24 hr tablet, [METFORMIN (GLUCOPHAGE-XR) 500 MG 24 HR TABLET] Take 2 tablets (1,000 mg total) by mouth daily with breakfast., Disp: 60 tablet, Rfl: 0       Social History :     Social History     Socioeconomic History     Marital status:      Spouse name: Not on file     Number of children: Not on file     Years of education: Not on file     Highest education level: Not on file   Occupational History     Not on file   Tobacco Use     Smoking status: Former Smoker     Smokeless tobacco: Never Used   Substance and Sexual Activity     Alcohol use: Not on file     Drug use: Not on file     Sexual activity: Not on file   Other Topics Concern     Not on file   Social History Narrative     Not on file     Social Determinants of Health     Financial Resource Strain:      Difficulty of Paying Living Expenses:    Food Insecurity:      Worried About Running Out of Food in the Last Year:      Ran Out of Food in the Last Year:    Transportation Needs:      Lack of Transportation (Medical):      Lack of Transportation (Non-Medical):    Physical Activity:      Days of Exercise per Week:      Minutes of Exercise per Session:    Stress:      Feeling of Stress :    Social Connections:      Frequency of Communication with Friends and Family:      Frequency of Social Gatherings with Friends and Family:      Attends Tenriism Services:      Active Member of Clubs or Organizations:      Attends Club or Organization Meetings:      Marital Status:    Intimate Partner  "Violence:      Fear of Current or Ex-Partner:      Emotionally Abused:      Physically Abused:      Sexually Abused:           Family History :     Family History   Problem Relation Age of Onset     Diabetes Type 2  Mother      Coronary Artery Disease Father 44.00     Diabetes Type 2  Brother      Review of Systems  A 12 point comprehensive review of systems was negative except as noted.        Objective:     /62   Pulse 93   Ht 1.575 m (5' 2\")   Wt 92.5 kg (204 lb)   SpO2 96%   BMI 37.31 kg/m    SpO2 at rest on RA 96%  SpO2 after walking 300 feet on RA was 93%    Gen: obese, awake, alert, no distress  HEENT: pink conjunctiva, moist mucosa, Mallampati II/IV  Neck: no thyromegaly, masses or JVD  Lungs: clear  CV: regular, no murmurs or gallops appreciated  Abdomen: soft, NT, BS wnl  Ext: trace edema  Neuro: CN II-XII intact, non focal      Diagnostic tests:         ECHOCARDIOGRAM:     Echocardiogram 6/19/2021    No previous study for comparison.    Technically challenging study. Definity contrast utilized.    Normal left ventricular size. Mild left ventricular hypertrophy.    Left ventricle ejection fraction is normal. The calculated left ventricular ejection fraction is 57%.    Abnormal septal motion consistent with left bundle branch block.    Right atrium and right ventricle are poorly visualized. Mildly abnormal TAPSE suggesting possible reduction in right ventricular function.    Incomplete visualization of aortic valve. Aortic valve appears sclerotic. No Doppler evidence to suggest significant aortic stenosis.    No obvious significant mitral or tricuspid insufficiency. Unable to accurately estimate RV systolic pressure secondary to incomplete tricuspid regurgitation velocity envelope.    Mild enlargement of the aortic root    Echocardiogram 8/5/2021  The left ventricle is normal in size with mild concentric left ventricular  hypertrophy.  Left ventricular function is normal.The ejection fraction is " 60-65%.  Normal right ventricle size and systolic function.  The aortic valve is trileaflet with aortic valve sclerosis. No aortic stenosis  is present.  Aortic root is mildly dilated.  Compared to the prior study of 6/19/21, there has been no significant change.     IMAGES:     CTA CHEST   LOCATION: [facility]   DATE/TIME: 6/18/2021 3:35 PM  INDICATION: Shortness of breath, dizziness   COMPARISON: CT 08/17/2012   FINDINGS:   ANGIOGRAM CHEST: Extensive pulmonary artery emboli including a saddle pulmonary artery embolus. On the right side there are emboli within the main, upper, middle, interlobar and lower lobar pulmonary arteries as well as their respective segmental and subsegmental branches. On the left, there are emboli within the main, upper, lingular and lower lobar pulmonary arteries and their respective segmental branches. Thoracic aorta is negative for dissection. Asymmetrically dilated right ventricle suggesting right heart strain.   LUNGS AND PLEURA: The central airways are clear. No focal consolidation or pleural effusion. No mass or suspicious nodule.   MEDIASTINUM/AXILLAE: Mild asymmetric left breast skin thickening. No discrete underlying breast mass. No thoracic adenopathy. Normal heart size and no pericardial effusion.  CORONARY ARTERY CALCIFICATION: Severe.  UPPER ABDOMEN: Cholelithiasis.   MUSCULOSKELETAL: Multiple old healed right-sided rib fractures. Osseous demineralization. Spinal degenerative changes.   IMPRESSION:   1.  Severe pulmonary artery emboli including a saddle pulmonary artery embolus and extensive bilateral pulmonary artery emboli as detailed above. Associated right heart strain.   2.  Nonspecific asymmetric left breast skin thickening. Recommend correlation. Nonemergent follow-up with mammography would be recommended if not recently performed.       VENOUS US LEs 6/19/2021  IMPRESSION:  1.  Exam is positive for nonocclusive thrombus in the right popliteal  vein.  2.  No evidence for DVT elsewhere in either lower extremity.

## 2021-08-12 NOTE — PATIENT INSTRUCTIONS
Long term anticoagulation with coumadin    Sleep hygiene     Increase activity level     Contact me if you change your mind regarding referral to physical therapy     Referral to sleep clinic     Follow up in 6 months

## 2021-08-13 ENCOUNTER — LAB REQUISITION (OUTPATIENT)
Dept: LAB | Facility: CLINIC | Age: 71
End: 2021-08-13
Payer: COMMERCIAL

## 2021-08-13 DIAGNOSIS — E53.8 DEFICIENCY OF OTHER SPECIFIED B GROUP VITAMINS: ICD-10-CM

## 2021-08-13 LAB
ERYTHROCYTE [DISTWIDTH] IN BLOOD BY AUTOMATED COUNT: 13.2 % (ref 10–15)
HCT VFR BLD AUTO: 33.5 % (ref 35–47)
HGB BLD-MCNC: 10.5 G/DL (ref 11.7–15.7)
MCH RBC QN AUTO: 32.7 PG (ref 26.5–33)
MCHC RBC AUTO-ENTMCNC: 31.3 G/DL (ref 31.5–36.5)
MCV RBC AUTO: 104 FL (ref 78–100)
PLATELET # BLD AUTO: 310 10E3/UL (ref 150–450)
RBC # BLD AUTO: 3.21 10E6/UL (ref 3.8–5.2)
VIT B12 SERPL-MCNC: 381 PG/ML (ref 213–816)
WBC # BLD AUTO: 5.5 10E3/UL (ref 4–11)

## 2021-08-13 PROCEDURE — 82607 VITAMIN B-12: CPT | Mod: ORL | Performed by: PHYSICIAN ASSISTANT

## 2021-08-13 PROCEDURE — 36415 COLL VENOUS BLD VENIPUNCTURE: CPT | Performed by: PHYSICIAN ASSISTANT

## 2021-08-13 PROCEDURE — 85027 COMPLETE CBC AUTOMATED: CPT | Performed by: PHYSICIAN ASSISTANT

## 2022-03-09 ENCOUNTER — LAB REQUISITION (OUTPATIENT)
Dept: LAB | Facility: CLINIC | Age: 72
End: 2022-03-09
Payer: COMMERCIAL

## 2022-03-09 DIAGNOSIS — E78.2 MIXED HYPERLIPIDEMIA: ICD-10-CM

## 2022-03-09 DIAGNOSIS — I10 ESSENTIAL (PRIMARY) HYPERTENSION: ICD-10-CM

## 2022-03-09 LAB
ALBUMIN SERPL-MCNC: 3.9 G/DL (ref 3.5–5)
ALP SERPL-CCNC: 51 U/L (ref 45–120)
ALT SERPL W P-5'-P-CCNC: <9 U/L (ref 0–45)
ANION GAP SERPL CALCULATED.3IONS-SCNC: 13 MMOL/L (ref 5–18)
AST SERPL W P-5'-P-CCNC: 13 U/L (ref 0–40)
BILIRUB SERPL-MCNC: 1.5 MG/DL (ref 0–1)
BUN SERPL-MCNC: 12 MG/DL (ref 8–28)
CALCIUM SERPL-MCNC: 9.6 MG/DL (ref 8.5–10.5)
CHLORIDE BLD-SCNC: 105 MMOL/L (ref 98–107)
CHOLEST SERPL-MCNC: 115 MG/DL
CO2 SERPL-SCNC: 22 MMOL/L (ref 22–31)
CREAT SERPL-MCNC: 0.82 MG/DL (ref 0.6–1.1)
FASTING STATUS PATIENT QL REPORTED: NORMAL
GFR SERPL CREATININE-BSD FRML MDRD: 75 ML/MIN/1.73M2
GLUCOSE BLD-MCNC: 117 MG/DL (ref 70–125)
HDLC SERPL-MCNC: 54 MG/DL
LDLC SERPL CALC-MCNC: 37 MG/DL
POTASSIUM BLD-SCNC: 3.8 MMOL/L (ref 3.5–5)
PROT SERPL-MCNC: 6.8 G/DL (ref 6–8)
SODIUM SERPL-SCNC: 140 MMOL/L (ref 136–145)
TRIGL SERPL-MCNC: 120 MG/DL

## 2022-03-09 PROCEDURE — 80053 COMPREHEN METABOLIC PANEL: CPT | Mod: ORL | Performed by: PHYSICIAN ASSISTANT

## 2022-03-09 PROCEDURE — 80061 LIPID PANEL: CPT | Mod: ORL | Performed by: PHYSICIAN ASSISTANT

## 2022-07-08 ENCOUNTER — LAB REQUISITION (OUTPATIENT)
Dept: LAB | Facility: CLINIC | Age: 72
End: 2022-07-08
Payer: COMMERCIAL

## 2022-07-08 DIAGNOSIS — R30.0 DYSURIA: ICD-10-CM

## 2022-07-08 DIAGNOSIS — I26.02 SADDLE EMBOLUS OF PULMONARY ARTERY WITH ACUTE COR PULMONALE (H): ICD-10-CM

## 2022-07-08 LAB — INR PPP: >10 (ref 0.85–1.15)

## 2022-07-08 PROCEDURE — 85610 PROTHROMBIN TIME: CPT | Mod: ORL | Performed by: NURSE PRACTITIONER

## 2022-07-08 PROCEDURE — 87088 URINE BACTERIA CULTURE: CPT | Mod: ORL | Performed by: NURSE PRACTITIONER

## 2022-07-11 ENCOUNTER — LAB REQUISITION (OUTPATIENT)
Dept: LAB | Facility: CLINIC | Age: 72
End: 2022-07-11
Payer: COMMERCIAL

## 2022-07-11 LAB
BACTERIA UR CULT: ABNORMAL
BACTERIA UR CULT: ABNORMAL

## 2022-07-11 PROCEDURE — 87077 CULTURE AEROBIC IDENTIFY: CPT | Mod: ORL | Performed by: FAMILY MEDICINE

## 2022-07-15 LAB
BACTERIA WND CULT: ABNORMAL
BACTERIA WND CULT: ABNORMAL

## 2022-08-05 ENCOUNTER — LAB REQUISITION (OUTPATIENT)
Dept: LAB | Facility: CLINIC | Age: 72
End: 2022-08-05
Payer: COMMERCIAL

## 2022-08-05 DIAGNOSIS — E83.42 HYPOMAGNESEMIA: ICD-10-CM

## 2022-08-05 DIAGNOSIS — I13.0 HYPERTENSIVE HEART AND CHRONIC KIDNEY DISEASE WITH HEART FAILURE AND STAGE 1 THROUGH STAGE 4 CHRONIC KIDNEY DISEASE, OR UNSPECIFIED CHRONIC KIDNEY DISEASE (H): ICD-10-CM

## 2022-08-05 LAB
ALBUMIN SERPL BCG-MCNC: 4.1 G/DL (ref 3.5–5.2)
ALP SERPL-CCNC: 47 U/L (ref 35–104)
ALT SERPL W P-5'-P-CCNC: 9 U/L (ref 10–35)
ANION GAP SERPL CALCULATED.3IONS-SCNC: 13 MMOL/L (ref 7–15)
AST SERPL W P-5'-P-CCNC: 17 U/L (ref 10–35)
BILIRUB SERPL-MCNC: 0.8 MG/DL
BUN SERPL-MCNC: 11.8 MG/DL (ref 8–23)
CALCIUM SERPL-MCNC: 9.6 MG/DL (ref 8.8–10.2)
CHLORIDE SERPL-SCNC: 103 MMOL/L (ref 98–107)
CREAT SERPL-MCNC: 0.83 MG/DL (ref 0.51–0.95)
DEPRECATED HCO3 PLAS-SCNC: 24 MMOL/L (ref 22–29)
GFR SERPL CREATININE-BSD FRML MDRD: 74 ML/MIN/1.73M2
GLUCOSE SERPL-MCNC: 132 MG/DL (ref 70–99)
MAGNESIUM SERPL-MCNC: 1.7 MG/DL (ref 1.7–2.3)
POTASSIUM SERPL-SCNC: 3.9 MMOL/L (ref 3.4–5.3)
PROT SERPL-MCNC: 6.4 G/DL (ref 6.4–8.3)
SODIUM SERPL-SCNC: 140 MMOL/L (ref 136–145)

## 2022-08-05 PROCEDURE — 80053 COMPREHEN METABOLIC PANEL: CPT | Mod: ORL | Performed by: PHYSICIAN ASSISTANT

## 2022-08-05 PROCEDURE — 83735 ASSAY OF MAGNESIUM: CPT | Mod: ORL | Performed by: PHYSICIAN ASSISTANT

## 2023-01-11 ENCOUNTER — LAB REQUISITION (OUTPATIENT)
Dept: LAB | Facility: CLINIC | Age: 73
End: 2023-01-11
Payer: COMMERCIAL

## 2023-01-11 DIAGNOSIS — E78.2 MIXED HYPERLIPIDEMIA: ICD-10-CM

## 2023-01-11 DIAGNOSIS — Z86.39 PERSONAL HISTORY OF OTHER ENDOCRINE, NUTRITIONAL AND METABOLIC DISEASE: ICD-10-CM

## 2023-01-11 DIAGNOSIS — E11.9 TYPE 2 DIABETES MELLITUS WITHOUT COMPLICATIONS (H): ICD-10-CM

## 2023-01-11 LAB
ALBUMIN SERPL BCG-MCNC: 4 G/DL (ref 3.5–5.2)
ALP SERPL-CCNC: 50 U/L (ref 35–104)
ALT SERPL W P-5'-P-CCNC: 15 U/L (ref 10–35)
ANION GAP SERPL CALCULATED.3IONS-SCNC: 15 MMOL/L (ref 7–15)
AST SERPL W P-5'-P-CCNC: 18 U/L (ref 10–35)
BILIRUB SERPL-MCNC: 0.9 MG/DL
BUN SERPL-MCNC: 17.9 MG/DL (ref 8–23)
CALCIUM SERPL-MCNC: 9.5 MG/DL (ref 8.8–10.2)
CHLORIDE SERPL-SCNC: 101 MMOL/L (ref 98–107)
CHOLEST SERPL-MCNC: 132 MG/DL
CREAT SERPL-MCNC: 0.88 MG/DL (ref 0.51–0.95)
DEPRECATED HCO3 PLAS-SCNC: 24 MMOL/L (ref 22–29)
GFR SERPL CREATININE-BSD FRML MDRD: 69 ML/MIN/1.73M2
GLUCOSE SERPL-MCNC: 111 MG/DL (ref 70–99)
HDLC SERPL-MCNC: 62 MG/DL
LDLC SERPL CALC-MCNC: 49 MG/DL
NONHDLC SERPL-MCNC: 70 MG/DL
POTASSIUM SERPL-SCNC: 4.5 MMOL/L (ref 3.4–5.3)
PROT SERPL-MCNC: 6.3 G/DL (ref 6.4–8.3)
SODIUM SERPL-SCNC: 140 MMOL/L (ref 136–145)
TRIGL SERPL-MCNC: 103 MG/DL

## 2023-01-11 PROCEDURE — 80053 COMPREHEN METABOLIC PANEL: CPT | Mod: ORL | Performed by: PHYSICIAN ASSISTANT

## 2023-01-11 PROCEDURE — 80061 LIPID PANEL: CPT | Mod: ORL | Performed by: PHYSICIAN ASSISTANT

## 2023-02-16 ENCOUNTER — LAB REQUISITION (OUTPATIENT)
Dept: LAB | Facility: CLINIC | Age: 73
End: 2023-02-16
Payer: COMMERCIAL

## 2023-02-16 DIAGNOSIS — E83.42 HYPOMAGNESEMIA: ICD-10-CM

## 2023-02-16 LAB — MAGNESIUM SERPL-MCNC: 1.7 MG/DL (ref 1.7–2.3)

## 2023-02-16 PROCEDURE — 83735 ASSAY OF MAGNESIUM: CPT | Mod: ORL | Performed by: PHYSICIAN ASSISTANT

## 2023-03-30 ENCOUNTER — LAB REQUISITION (OUTPATIENT)
Dept: LAB | Facility: CLINIC | Age: 73
End: 2023-03-30
Payer: COMMERCIAL

## 2023-03-30 DIAGNOSIS — R30.0 DYSURIA: ICD-10-CM

## 2023-05-16 ENCOUNTER — LAB REQUISITION (OUTPATIENT)
Dept: LAB | Facility: CLINIC | Age: 73
End: 2023-05-16
Payer: COMMERCIAL

## 2023-05-16 DIAGNOSIS — R30.0 DYSURIA: ICD-10-CM

## 2023-05-16 PROCEDURE — 87088 URINE BACTERIA CULTURE: CPT | Mod: ORL | Performed by: PHYSICIAN ASSISTANT

## 2023-05-19 LAB
BACTERIA UR CULT: ABNORMAL
BACTERIA UR CULT: ABNORMAL

## 2023-06-24 ENCOUNTER — LAB REQUISITION (OUTPATIENT)
Dept: LAB | Facility: CLINIC | Age: 73
End: 2023-06-24

## 2023-06-24 DIAGNOSIS — Z51.6 NEED FOR DESENSITIZATION TO ALLERGENS: ICD-10-CM

## 2023-06-24 PROCEDURE — 87635 SARS-COV-2 COVID-19 AMP PRB: CPT | Performed by: FAMILY MEDICINE

## 2023-06-25 ENCOUNTER — LAB REQUISITION (OUTPATIENT)
Dept: LAB | Facility: CLINIC | Age: 73
End: 2023-06-25
Payer: COMMERCIAL

## 2023-06-25 DIAGNOSIS — I82.409 ACUTE EMBOLISM AND THROMBOSIS OF UNSPECIFIED DEEP VEINS OF UNSPECIFIED LOWER EXTREMITY (H): ICD-10-CM

## 2023-06-25 LAB — SARS-COV-2 RNA RESP QL NAA+PROBE: NEGATIVE

## 2023-06-26 ENCOUNTER — TELEPHONE (OUTPATIENT)
Dept: GERIATRICS | Facility: CLINIC | Age: 73
End: 2023-06-26

## 2023-06-26 ENCOUNTER — TRANSITIONAL CARE UNIT VISIT (OUTPATIENT)
Dept: GERIATRICS | Facility: CLINIC | Age: 73
End: 2023-06-26
Payer: COMMERCIAL

## 2023-06-26 VITALS
HEART RATE: 82 BPM | WEIGHT: 170 LBS | OXYGEN SATURATION: 94 % | SYSTOLIC BLOOD PRESSURE: 109 MMHG | RESPIRATION RATE: 18 BRPM | TEMPERATURE: 97.9 F | HEIGHT: 62 IN | BODY MASS INDEX: 31.28 KG/M2 | DIASTOLIC BLOOD PRESSURE: 61 MMHG

## 2023-06-26 DIAGNOSIS — Z86.73 H/O: CVA (CEREBROVASCULAR ACCIDENT): ICD-10-CM

## 2023-06-26 DIAGNOSIS — I26.09 ACUTE PULMONARY EMBOLISM WITH ACUTE COR PULMONALE, UNSPECIFIED PULMONARY EMBOLISM TYPE (H): ICD-10-CM

## 2023-06-26 DIAGNOSIS — I10 ESSENTIAL HYPERTENSION: Primary | ICD-10-CM

## 2023-06-26 DIAGNOSIS — E11.9 TYPE 2 DIABETES MELLITUS WITHOUT COMPLICATION, WITHOUT LONG-TERM CURRENT USE OF INSULIN (H): ICD-10-CM

## 2023-06-26 DIAGNOSIS — F33.42 MAJOR DEPRESSIVE DISORDER, RECURRENT EPISODE, IN FULL REMISSION (H): ICD-10-CM

## 2023-06-26 DIAGNOSIS — S42.002D CLOSED NONDISPLACED FRACTURE OF LEFT CLAVICLE WITH ROUTINE HEALING, UNSPECIFIED PART OF CLAVICLE, SUBSEQUENT ENCOUNTER: ICD-10-CM

## 2023-06-26 DIAGNOSIS — N18.31 CHRONIC KIDNEY DISEASE, STAGE 3A (H): ICD-10-CM

## 2023-06-26 PROBLEM — J96.10 CHRONIC RESPIRATORY FAILURE (H): Status: RESOLVED | Noted: 2021-06-20 | Resolved: 2023-06-26

## 2023-06-26 LAB — INR PPP: 1.94 (ref 0.85–1.15)

## 2023-06-26 PROCEDURE — 99305 1ST NF CARE MODERATE MDM 35: CPT | Performed by: FAMILY MEDICINE

## 2023-06-26 PROCEDURE — P9604 ONE-WAY ALLOW PRORATED TRIP: HCPCS | Performed by: FAMILY MEDICINE

## 2023-06-26 PROCEDURE — 85610 PROTHROMBIN TIME: CPT | Performed by: FAMILY MEDICINE

## 2023-06-26 PROCEDURE — 36415 COLL VENOUS BLD VENIPUNCTURE: CPT | Performed by: FAMILY MEDICINE

## 2023-06-26 RX ORDER — ACETAMINOPHEN 325 MG/1
650 TABLET ORAL 3 TIMES DAILY
COMMUNITY

## 2023-06-26 RX ORDER — SENNOSIDES A AND B 8.6 MG/1
1 TABLET, FILM COATED ORAL 2 TIMES DAILY
COMMUNITY
End: 2023-06-27

## 2023-06-26 RX ORDER — HYDROCODONE BITARTRATE AND ACETAMINOPHEN 5; 325 MG/1; MG/1
1 TABLET ORAL EVERY 6 HOURS PRN
COMMUNITY

## 2023-06-26 RX ORDER — POLYETHYLENE GLYCOL 3350 17 G/17G
1 POWDER, FOR SOLUTION ORAL DAILY PRN
COMMUNITY

## 2023-06-26 RX ORDER — POTASSIUM CHLORIDE 750 MG/1
10 TABLET, EXTENDED RELEASE ORAL 2 TIMES DAILY
COMMUNITY

## 2023-06-26 RX ORDER — OXYBUTYNIN CHLORIDE 10 MG/1
10 TABLET, EXTENDED RELEASE ORAL DAILY
COMMUNITY

## 2023-06-26 RX ORDER — ALENDRONATE SODIUM 70 MG/1
70 TABLET ORAL WEEKLY
COMMUNITY
Start: 2023-06-26 | End: 2023-07-17

## 2023-06-26 NOTE — LETTER
6/26/2023        RE: Laurel Darden  [address]  [facility] [address]        [facility]       Patient Laurel Darden  MRN: [id number]        Reason for Visit     Chief Complaint   Patient presents with     Hospital F/U       Code Status     DNR / DNI    Assessment     L clavicular fracture  HTN  DM  PE on warfarin  Depression   CKD 3A  H/O OF CVA  Generalized weakness    Plan     Pt is admitted to TCU for strengthening and rehab.  Pt evaluated by ortho and given conservative treatment  Discharged NWB HERO  Cont sling for comfort  Follow-up ortho as scheduled in 2 weeks   Pain control was reviewed with her-she has been discharged on Norco.  She is reporting her pain as high  Schedule Tylenol 650 3 times daily   icing to her shoulder 3 times a day  Minimize narcotic intake because of confusion noted  Dm control reviewed  She is on metformin   discontinue BG checks-all blood sugars are under 150  Recheck labs including an A1c to assess control  INR pending for today  R/c BMP  Monitor blood pressures noted to be low.  Hold parameters given for her lisinopril.  DC oxybutynin and high doses and switch to oxybutynin XL 10 mg daily in the morning only  Continue with PT/OT-at baseline has gait instability from a prior CVA and using a cane    History     Patient is a very pleasant 73 year old female who is admitted to TCU  Pt was admitted post fall  She was admitted with L clavicular fracture   Seen by orthopedics and given NWB on LUPATRICE  Discharged to TCU  Has balance issues from old cva      Past Medical & Surgical History     PAST MEDICAL HISTORY:   Past Medical History:   Diagnosis Date     Age related osteoporosis 6/18/2021     Chronic kidney disease, stage 3a (H) 6/18/2021     Essential hypertension 1/21/2008     H/O: CVA (cerebrovascular accident) 4/12/2010     Malignant melanoma of skin of upper limb, including shoulder (H) 5/8/2008     Mixed hyperlipidemia 6/18/2021     Obesity  6/18/2021     Recurrent major depression in full remission (H) 6/18/2021     Sleep apnea 6/18/2021     Type 2 diabetes mellitus without complication (H) 1/21/2008      PAST SURGICAL HISTORY:   has a past surgical history that includes IR Pulmonary Angiogram Bilateral (6/18/2021); Hysterectomy; tonsillectomy & adenoidectomy; Skin Cancer Excision; and Ir Pulmonary Angiogram Bilateral (6/18/2021).      Past Social History     Reviewed,  reports that she has quit smoking. She has never used smokeless tobacco.    Family History     Reviewed, and family history includes Coronary Artery Disease (age of onset: 44.00) in her father; Diabetes Type 2  in her brother and mother.    Medication List     Current Outpatient Medications   Medication     alendronate (FOSAMAX) 70 MG tablet     ASPIRIN NOT PRESCRIBED (INTENTIONAL)     atorvastatin (LIPITOR) 80 MG tablet     blood glucose (ACCU-CHEK VIOLETTA PLUS) test strip     citalopram (CELEXA) 40 MG tablet     cyanocobalamin (VITAMIN B-12) 1000 MCG sublingual tablet     HYDROcodone-acetaminophen (NORCO) 5-325 MG tablet     lisinopril (ZESTRIL) 20 MG tablet     metFORMIN (GLUCOPHAGE) 500 MG tablet     oxybutynin (DITROPAN) 5 MG tablet     oxybutynin (DITROPAN) 5 MG tablet     polyethylene glycol (MIRALAX) 17 g packet     potassium chloride ER (KLOR-CON M) 10 MEQ CR tablet     senna (SENOKOT) 8.6 MG tablet     warfarin ANTICOAGULANT (COUMADIN) 7.5 MG tablet     warfarin ANTICOAGULANT (COUMADIN/JANTOVEN) 7.5 MG tablet     No current facility-administered medications for this visit.      MED REC REQUIRED  Post Medication Reconciliation Status: discharge medications reconciled and changed, per note/orders       Allergies     Allergies   Allergen Reactions     Penicillins Shortness Of Breath and Hives       Review of Systems   A comprehensive review of 14 systems was done. Pertinent findings noted here and in history of present illness. All the rest negative.  Constitutional: Negative.   "Negative for fever, chills, she has  activity change, appetite change and fatigue.   HENT: Negative for congestion and facial swelling.    Eyes: Negative for photophobia, redness and visual disturbance.   Respiratory: Negative for cough and chest tightness.    Cardiovascular: Negative for chest pain, palpitations and leg swelling.   Gastrointestinal: Negative for nausea, diarrhea, constipation, blood in stool and abdominal distention.   Genitourinary: Negative.    Musculoskeletal: Stating that pain is high in her left shoulder as well as the left side of the chest  Skin: Negative.    Neurological: Negative for dizziness, tremors, syncope, weakness, light-headedness and headaches.   Hematological: Does not bruise/bleed easily.   Psychiatric/Behavioral: Negative.  Some recall issues      Physical Exam   /61   Pulse 82   Temp 97.9  F (36.6  C)   Resp 18   Ht 1.575 m (5' 2\")   Wt 77.1 kg (170 lb)   SpO2 94%   BMI 31.09 kg/m       Constitutional: Oriented to person, place, and time and appears well-developed.   HEENT:  Normocephalic and atraumatic.  Eyes: Conjunctivae and EOM are normal. Pupils are equal, round, and reactive to light. No discharge.  No scleral icterus. Nose normal. Mouth/Throat: Oropharynx is clear and moist. No oropharyngeal exudate.    NECK: Normal range of motion. Neck supple. No JVD present. No tracheal deviation present. No thyromegaly present.   CARDIOVASCULAR: Normal rate, regular rhythm and intact distal pulses.  Exam reveals no gallop and no friction rub.  Systolic murmur present.  PULMONARY: Effort normal and breath sounds normal. No respiratory distress.No Wheezing or rales.  ABDOMEN: Soft. Bowel sounds are normal. No distension and no mass.  There is no tenderness. There is no rebound and no guarding. No HSM.  MUSCULOSKELETAL: Normal range of motion. Mild kyphosis, no tenderness.  Left upper extremity is in a sling  LYMPH NODES: Has no cervical, supraclavicular, axillary and " groin adenopathy.   NEUROLOGICAL: Alert and oriented to person, place, and time. No cranial nerve deficit.  Normal muscle tone. Coordination normal.   GENITOURINARY: Deferred exam.  SKIN: Skin is warm and dry. No rash noted. No erythema. No pallor.   Extensive ecchymoses noted over the left shoulder patient is tender  EXTREMITIES: No cyanosis, no clubbing, no edema. No Deformity.  PSYCHIATRIC: Normal mood, affect and behavior.      Lab Results     Recent Results (from the past 240 hour(s))   COVID-19 Virus (Coronavirus) by PCR    Collection Time: 06/24/23  7:00 PM    Specimen: Nasopharyngeal; Swab   Result Value Ref Range    SARS CoV2 PCR Negative Negative                 Electronically signed by    Ricarda Lorenzo MD                           Sincerely,        ANDRA Ortiz

## 2023-06-26 NOTE — PROGRESS NOTES
[facility]IATR[facility]       Patient Laurel Darden  MRN: [id number]        Reason for Visit     Chief Complaint   Patient presents with     Hospital F/U       Code Status     DNR / DNI    Assessment     L clavicular fracture  HTN  DM  PE on warfarin  Depression   CKD 3A  H/O OF CVA  Generalized weakness    Plan     Pt is admitted to TCU for strengthening and rehab.  Pt evaluated by ortho and given conservative treatment  Discharged NWB HERO  Cont sling for comfort  Follow-up ortho as scheduled in 2 weeks   Pain control was reviewed with her-she has been discharged on Norco.  She is reporting her pain as high  Schedule Tylenol 650 3 times daily   icing to her shoulder 3 times a day  Minimize narcotic intake because of confusion noted  Dm control reviewed  She is on metformin   discontinue BG checks-all blood sugars are under 150  Recheck labs including an A1c to assess control  INR pending for today  R/c BMP  Monitor blood pressures noted to be low.  Hold parameters given for her lisinopril.  DC oxybutynin and high doses and switch to oxybutynin XL 10 mg daily in the morning only  Continue with PT/OT-at baseline has gait instability from a prior CVA and using a cane    History     Patient is a very pleasant 73 year old female who is admitted to TCU  Pt was admitted post fall  She was admitted with L clavicular fracture   Seen by orthopedics and given NWB on LUE  Discharged to TCU  Has balance issues from old cva      Past Medical & Surgical History     PAST MEDICAL HISTORY:   Past Medical History:   Diagnosis Date     Age related osteoporosis 6/18/2021     Chronic kidney disease, stage 3a (H) 6/18/2021     Essential hypertension 1/21/2008     H/O: CVA (cerebrovascular accident) 4/12/2010     Malignant melanoma of skin of upper limb, including shoulder (H) 5/8/2008     Mixed hyperlipidemia 6/18/2021     Obesity 6/18/2021     Recurrent major depression in full remission (H) 6/18/2021     Sleep apnea 6/18/2021      Type 2 diabetes mellitus without complication (H) 1/21/2008      PAST SURGICAL HISTORY:   has a past surgical history that includes IR Pulmonary Angiogram Bilateral (6/18/2021); Hysterectomy; tonsillectomy & adenoidectomy; Skin Cancer Excision; and Ir Pulmonary Angiogram Bilateral (6/18/2021).      Past Social History     Reviewed,  reports that she has quit smoking. She has never used smokeless tobacco.    Family History     Reviewed, and family history includes Coronary Artery Disease (age of onset: 44.00) in her father; Diabetes Type 2  in her brother and mother.    Medication List     Current Outpatient Medications   Medication     alendronate (FOSAMAX) 70 MG tablet     ASPIRIN NOT PRESCRIBED (INTENTIONAL)     atorvastatin (LIPITOR) 80 MG tablet     blood glucose (ACCU-CHEK VIOLETTA PLUS) test strip     citalopram (CELEXA) 40 MG tablet     cyanocobalamin (VITAMIN B-12) 1000 MCG sublingual tablet     HYDROcodone-acetaminophen (NORCO) 5-325 MG tablet     lisinopril (ZESTRIL) 20 MG tablet     metFORMIN (GLUCOPHAGE) 500 MG tablet     oxybutynin (DITROPAN) 5 MG tablet     oxybutynin (DITROPAN) 5 MG tablet     polyethylene glycol (MIRALAX) 17 g packet     potassium chloride ER (KLOR-CON M) 10 MEQ CR tablet     senna (SENOKOT) 8.6 MG tablet     warfarin ANTICOAGULANT (COUMADIN) 7.5 MG tablet     warfarin ANTICOAGULANT (COUMADIN/JANTOVEN) 7.5 MG tablet     No current facility-administered medications for this visit.      MED REC REQUIRED  Post Medication Reconciliation Status: discharge medications reconciled and changed, per note/orders       Allergies     Allergies   Allergen Reactions     Penicillins Shortness Of Breath and Hives       Review of Systems   A comprehensive review of 14 systems was done. Pertinent findings noted here and in history of present illness. All the rest negative.  Constitutional: Negative.  Negative for fever, chills, she has  activity change, appetite change and fatigue.   HENT: Negative  "for congestion and facial swelling.    Eyes: Negative for photophobia, redness and visual disturbance.   Respiratory: Negative for cough and chest tightness.    Cardiovascular: Negative for chest pain, palpitations and leg swelling.   Gastrointestinal: Negative for nausea, diarrhea, constipation, blood in stool and abdominal distention.   Genitourinary: Negative.    Musculoskeletal: Stating that pain is high in her left shoulder as well as the left side of the chest  Skin: Negative.    Neurological: Negative for dizziness, tremors, syncope, weakness, light-headedness and headaches.   Hematological: Does not bruise/bleed easily.   Psychiatric/Behavioral: Negative.  Some recall issues      Physical Exam   /61   Pulse 82   Temp 97.9  F (36.6  C)   Resp 18   Ht 1.575 m (5' 2\")   Wt 77.1 kg (170 lb)   SpO2 94%   BMI 31.09 kg/m       Constitutional: Oriented to person, place, and time and appears well-developed.   HEENT:  Normocephalic and atraumatic.  Eyes: Conjunctivae and EOM are normal. Pupils are equal, round, and reactive to light. No discharge.  No scleral icterus. Nose normal. Mouth/Throat: Oropharynx is clear and moist. No oropharyngeal exudate.    NECK: Normal range of motion. Neck supple. No JVD present. No tracheal deviation present. No thyromegaly present.   CARDIOVASCULAR: Normal rate, regular rhythm and intact distal pulses.  Exam reveals no gallop and no friction rub.  Systolic murmur present.  PULMONARY: Effort normal and breath sounds normal. No respiratory distress.No Wheezing or rales.  ABDOMEN: Soft. Bowel sounds are normal. No distension and no mass.  There is no tenderness. There is no rebound and no guarding. No HSM.  MUSCULOSKELETAL: Normal range of motion. Mild kyphosis, no tenderness.  Left upper extremity is in a sling  LYMPH NODES: Has no cervical, supraclavicular, axillary and groin adenopathy.   NEUROLOGICAL: Alert and oriented to person, place, and time. No cranial nerve " deficit.  Normal muscle tone. Coordination normal.   GENITOURINARY: Deferred exam.  SKIN: Skin is warm and dry. No rash noted. No erythema. No pallor.   Extensive ecchymoses noted over the left shoulder patient is tender  EXTREMITIES: No cyanosis, no clubbing, no edema. No Deformity.  PSYCHIATRIC: Normal mood, affect and behavior.      Lab Results     Recent Results (from the past 240 hour(s))   COVID-19 Virus (Coronavirus) by PCR    Collection Time: 06/24/23  7:00 PM    Specimen: Nasopharyngeal; Swab   Result Value Ref Range    SARS CoV2 PCR Negative Negative                 Electronically signed by    Ricarda Lorenzo MD

## 2023-06-26 NOTE — TELEPHONE ENCOUNTER
[facility] Triage Nurse INR     Provider: Ricarda Lorenzo MD  Facility: [facility]   Facility Type:  TCU    Caller: Ivon  Call Back Number: [phone]  Reason for call: INR  Diagnosis/Goal: hx of PE    Todays INR: 1.94  Last INR 6/24 2.0(7.5mg Mon and Fri and 3.75mg AOD---home dose)    Heparin/Lovenox:  No  Currently on ABX?: No  Other interacting medication:  None  Missed or refused doses: No    Verbal Order/Direction given by Provider: Continue Warfarin 7.5mg Mondays and Fridays and 3.75mg all other days.  Next INR 7/5/23.      Provider Giving Order:  Ricarda Lorenzo MD    Verbal Order given to: Ivon Quevedo RN

## 2023-06-27 ENCOUNTER — LAB REQUISITION (OUTPATIENT)
Dept: LAB | Facility: CLINIC | Age: 73
End: 2023-06-27
Payer: COMMERCIAL

## 2023-06-27 ENCOUNTER — TRANSITIONAL CARE UNIT VISIT (OUTPATIENT)
Dept: GERIATRICS | Facility: CLINIC | Age: 73
End: 2023-06-27
Payer: COMMERCIAL

## 2023-06-27 VITALS
WEIGHT: 171 LBS | HEART RATE: 76 BPM | SYSTOLIC BLOOD PRESSURE: 167 MMHG | BODY MASS INDEX: 31.47 KG/M2 | OXYGEN SATURATION: 97 % | RESPIRATION RATE: 18 BRPM | TEMPERATURE: 97.7 F | HEIGHT: 62 IN | DIASTOLIC BLOOD PRESSURE: 84 MMHG

## 2023-06-27 DIAGNOSIS — N18.31 CHRONIC KIDNEY DISEASE, STAGE 3A (H): ICD-10-CM

## 2023-06-27 DIAGNOSIS — I10 ESSENTIAL HYPERTENSION: ICD-10-CM

## 2023-06-27 DIAGNOSIS — S42.002D CLOSED NONDISPLACED FRACTURE OF LEFT CLAVICLE WITH ROUTINE HEALING, UNSPECIFIED PART OF CLAVICLE, SUBSEQUENT ENCOUNTER: Primary | ICD-10-CM

## 2023-06-27 DIAGNOSIS — I10 ESSENTIAL (PRIMARY) HYPERTENSION: ICD-10-CM

## 2023-06-27 DIAGNOSIS — E11.9 TYPE 2 DIABETES MELLITUS WITHOUT COMPLICATION, WITHOUT LONG-TERM CURRENT USE OF INSULIN (H): ICD-10-CM

## 2023-06-27 DIAGNOSIS — Z86.73 H/O: CVA (CEREBROVASCULAR ACCIDENT): ICD-10-CM

## 2023-06-27 DIAGNOSIS — K59.03 DRUG-INDUCED CONSTIPATION: ICD-10-CM

## 2023-06-27 PROCEDURE — 99310 SBSQ NF CARE HIGH MDM 45: CPT | Performed by: NURSE PRACTITIONER

## 2023-06-27 RX ORDER — AMOXICILLIN 250 MG
1 CAPSULE ORAL DAILY PRN
COMMUNITY

## 2023-06-27 RX ORDER — BISACODYL 10 MG
10 SUPPOSITORY, RECTAL RECTAL DAILY PRN
COMMUNITY

## 2023-06-27 NOTE — PROGRESS NOTES
Code Status:  DNR/DNI  Visit Type: RECHECK (INITIAL)     Facility:   [facility] ([facility]) [[id number]]        History of Present Illness:   Hospital Admission Date: 6/21/2023    Hospital Discharge Date: 6/24/2023      Laurel Darden is a 73 year old female with a past medical history for DM2, pulmonary embolism on warfarin, CKD, Hx of CVA, HLD, HTN, DM2, LALO.  She was recently hospitalized after a fall in which she sustained a left clavicle fracture.  Orthopedics consulted and recommended nonoperative management, NWB and follow up xray in 2 weeks. She did have a high INR on admission and so she was given Vitamin K.  She had a decrease in hgb which subsequently stablized and discharge hgb was 9.9.     Today, her biggest complaint is feeling uncomfortable and constipation.  She reports not having a BM since before she was hospitalized.  She has been getting Senna BID and Miralax daily.  She reports pain in her left clavicle with improvement with medications.     Past Medical History:   Diagnosis Date     Age related osteoporosis 6/18/2021     Chronic kidney disease, stage 3a (H) 6/18/2021     Essential hypertension 1/21/2008     H/O: CVA (cerebrovascular accident) 4/12/2010     Malignant melanoma of skin of upper limb, including shoulder (H) 5/8/2008     Mixed hyperlipidemia 6/18/2021     Obesity 6/18/2021     Recurrent major depression in full remission (H) 6/18/2021     Sleep apnea 6/18/2021     Type 2 diabetes mellitus without complication (H) 1/21/2008     Past Surgical History:   Procedure Laterality Date     HYSTERECTOMY       IR PULMONARY ANGIOGRAM BILATERAL  6/18/2021     IR PULMONARY ANGIOGRAM BILATERAL  6/18/2021     SKIN CANCER EXCISION       TONSILLECTOMY & ADENOIDECTOMY       Family History   Problem Relation Age of Onset     Diabetes Type 2  Mother      Coronary Artery Disease Father 44.00     Diabetes Type 2  Brother      Social History     Socioeconomic History     Marital status:       Spouse name: Not on file     Number of children: Not on file     Years of education: Not on file     Highest education level: Not on file   Occupational History     Not on file   Tobacco Use     Smoking status: Former     Smokeless tobacco: Never   Substance and Sexual Activity     Alcohol use: Not on file     Drug use: Not on file     Sexual activity: Not on file   Other Topics Concern     Not on file   Social History Narrative     Not on file     Social Determinants of Health     Financial Resource Strain: Not on file   Food Insecurity: Not on file   Transportation Needs: Not on file   Physical Activity: Not on file   Stress: Not on file   Social Connections: Not on file   Intimate Partner Violence: Not on file   Housing Stability: Not on file     Current Outpatient Medications   Medication     acetaminophen (TYLENOL) 325 MG tablet     alendronate (FOSAMAX) 70 MG tablet     ASPIRIN NOT PRESCRIBED (INTENTIONAL)     atorvastatin (LIPITOR) 80 MG tablet     bisacodyl (DULCOLAX) 10 MG suppository     blood glucose (ACCU-CHEK VIOLETTA PLUS) test strip     citalopram (CELEXA) 40 MG tablet     cyanocobalamin (VITAMIN B-12) 1000 MCG sublingual tablet     HYDROcodone-acetaminophen (NORCO) 5-325 MG tablet     lisinopril (ZESTRIL) 20 MG tablet     metFORMIN (GLUCOPHAGE) 500 MG tablet     oxybutynin ER (DITROPAN XL) 10 MG 24 hr tablet     polyethylene glycol (MIRALAX) 17 g packet     potassium chloride ER (KLOR-CON M) 10 MEQ CR tablet     senna-docusate (SENOKOT-S/PERICOLACE) 8.6-50 MG tablet     WARFARIN SODIUM PO     No current facility-administered medications for this visit.         Allergies   Allergen Reactions     Penicillins Shortness Of Breath and Hives     Immunization History   Administered Date(s) Administered     COVID-19 Monovalent 18+ (Moderna) 05/19/2021, 06/16/2021     FLU 6-35 months 09/24/2010, 09/26/2011     FLUAD(HD)65+ QUAD 09/16/2021, 11/30/2022     Influenza (High Dose) 3 valent vaccine 08/27/2016      "Influenza (IIV3) PF 10/13/2007, 10/23/2008, 10/10/2009, 09/24/2010, 09/22/2012, 11/29/2013     Influenza Vaccine >6 months (Alfuria,Fluzone) 11/15/2018     Influenza Vaccine, 6+MO IM (QUADRIVALENT W/PRESERVATIVES) 11/24/2017     Pneumo Conj 13-V (2010&after) 11/24/2017, 12/13/2019     Pneumococcal 23 valent 11/29/2013, 09/16/2021     TD,PF 7+ (Tenivac) 10/13/2007     Td (Adult), Adsorbed 10/13/2007     Zoster vaccine, live 04/10/2013         Post Discharge Medication Reconciliation Status: discharge medications reconciled and changed, per note/orders.    Medications list and allergies in the facility chart have been reviewed.  Please see facility EMR for most up to date list.         Review of Systems   Patient denies fever, chills, headache, lightheadedness, dizziness, rhinorrhea, cough, congestion, shortness of breath, chest pain, palpitations, n/v, diarrhea,change in appetite, change in sleep pattern, dysuria, frequency, burning or pain with urination.  Other than stated in HPI all other review of systems is negative.         Physical Exam  Vital signs:BP (!) 167/84   Pulse 76   Temp 97.7  F (36.5  C)   Resp 18   Ht 1.575 m (5' 2\")   Wt 77.6 kg (171 lb)   SpO2 97%   BMI 31.28 kg/m     GENERAL APPEARANCE: Well developed, well nourished, in no acute distress.  HEENT: normocephalic, atraumatic  sclerae anicteric, conjunctivae clear and moist, EOM intact  LUNGS: Lung sounds CTA, no adventitious sounds, respiratory effort normal.  CARD: RRR, S1, S2, without murmurs, gallops, rubs  ABD: Soft, nondistended and nontender with normal bowel sounds.   MSK: left UE ROM limited due to pain and restrictions.    EXTREMITIES: left clavicle edema and ecchymosis  NEURO: Alert and oriented x 3. Face is symmetric.  SKIN: ecchymosis of clavicle  PSYCH: euthymic          Labs:   HEMOGLOBIN  9.9 (L) 12.0 - 16.0 g/dL   06/24/2023 7:46 AM CDT [facility] LABORATORY     MCV  101 (H) 80 - 100 fL          Component Value Ref " Range Test Method Analysis Time Performed At Pathologist Signature   eGFR 85 (L) >90 mL/min/1.73m2   06/23/2023 8:55 AM CDT [facility]     Comment: As of 03/15/2022, eGFR is calculated by the CKD-EPI creatinine equation without race adjustment.  eGFR can be influenced by muscle mass, exercise, and diet.  The reported eGFR is an estimation only and is only applicable if the renal function is stable.   CREATININE 0.74 0.50 - 0.90 mg/dL         INR   Date Value Ref Range Status   06/26/2023 1.94 (H) 0.85 - 1.15 Final              Assessment/plan:   Closed nondisplaced fracture of left clavicle with routine healing, unspecified part of clavicle, subsequent encounter  Pain controlled, continue with [place] prn.  Sling on and NWB of left arm.  ICE often.  PT/OT eval and treat.  Follow up with ortho for repeat imaging in 2 weeks.      Essential hypertension  Variable, most are acceptable.  Today /84 which is likely directly related to pain.      Type 2 diabetes mellitus without complication, without long-term current use of insulin (H)  BS controlled, continue with Metformin.  If BS continue to be controlled in 1 week stop checking BS.  Last A1c 6.0.  Will recheck A1c     H/O: CVA (cerebrovascular accident)  Stable, continue with statin and on Warfarin.     Chronic kidney disease, stage 3a (H)  Last GFR 85, monitor labs and avoid nephrotoxins.     Drug-induced constipation  Severe, counseled patient on the need for suppository and would suspect diarrhea for the next day due to multiple laxatives.  Ordered for suppository today, discontinue senna and start on senna s prn.  Continue with daily miralax and assess the need to adjust in the next 2 days.     45 total minutes spent reviewing medical records from [facility], assessing patient, counseling on the above plan of care and coordinating suppository with nursing.     Electronically signed by: Abbey Millard NP

## 2023-06-28 ENCOUNTER — TELEPHONE (OUTPATIENT)
Dept: GERIATRICS | Facility: CLINIC | Age: 73
End: 2023-06-28
Payer: COMMERCIAL

## 2023-06-28 NOTE — TELEPHONE ENCOUNTER
[facility] En[facility]    Provider: ZACK Daniels  Facility: [facility]  Facility Type:  TCU    Caller: Karen   Call Back Number: [phone]    Allergies:    Allergies   Allergen Reactions     Penicillins Shortness Of Breath and Hives        Reason for call:   Patient is noted to have redness in her abdominal fold, nursing requesting nystatin powder.    Verbal Order/Direction given by Provider: Nystatin powder twice daily to abdominal fold X 14 days.    Provider giving Order:  ZACK Daniels    Verbal Order given to: Karen Rodriguez RN

## 2023-06-29 ENCOUNTER — TELEPHONE (OUTPATIENT)
Dept: GERIATRICS | Facility: CLINIC | Age: 73
End: 2023-06-29
Payer: COMMERCIAL

## 2023-06-29 LAB
ANION GAP SERPL CALCULATED.3IONS-SCNC: 10 MMOL/L (ref 7–15)
BUN SERPL-MCNC: 21.4 MG/DL (ref 8–23)
CALCIUM SERPL-MCNC: 10 MG/DL (ref 8.8–10.2)
CHLORIDE SERPL-SCNC: 102 MMOL/L (ref 98–107)
CREAT SERPL-MCNC: 0.77 MG/DL (ref 0.51–0.95)
DEPRECATED HCO3 PLAS-SCNC: 26 MMOL/L (ref 22–29)
GFR SERPL CREATININE-BSD FRML MDRD: 80 ML/MIN/1.73M2
GLUCOSE SERPL-MCNC: 117 MG/DL (ref 70–99)
POTASSIUM SERPL-SCNC: 4.7 MMOL/L (ref 3.4–5.3)
SODIUM SERPL-SCNC: 138 MMOL/L (ref 136–145)

## 2023-06-29 PROCEDURE — 82310 ASSAY OF CALCIUM: CPT | Performed by: FAMILY MEDICINE

## 2023-06-29 PROCEDURE — 36415 COLL VENOUS BLD VENIPUNCTURE: CPT | Performed by: FAMILY MEDICINE

## 2023-06-29 PROCEDURE — P9604 ONE-WAY ALLOW PRORATED TRIP: HCPCS | Performed by: FAMILY MEDICINE

## 2023-06-29 NOTE — TELEPHONE ENCOUNTER
[facility] Lab Note     Provider: ZACK Daniels  Facility: [facility]  Facility Type:  TCU    Allergies   Allergen Reactions     Penicillins Shortness Of Breath and Hives       Labs Reviewed by provider: TOMASZ     Verbal Order/Direction given by Provider: No new orders.      Provider giving Order:  ZACK Daniels    Verbal Order given to: Acosta([phone])    Edis Quevedo RN

## 2023-07-01 ENCOUNTER — LAB REQUISITION (OUTPATIENT)
Dept: LAB | Facility: CLINIC | Age: 73
End: 2023-07-01
Payer: COMMERCIAL

## 2023-07-01 DIAGNOSIS — I82.409 ACUTE EMBOLISM AND THROMBOSIS OF UNSPECIFIED DEEP VEINS OF UNSPECIFIED LOWER EXTREMITY (H): ICD-10-CM

## 2023-07-03 ENCOUNTER — TRANSITIONAL CARE UNIT VISIT (OUTPATIENT)
Dept: GERIATRICS | Facility: CLINIC | Age: 73
End: 2023-07-03
Payer: COMMERCIAL

## 2023-07-03 VITALS
TEMPERATURE: 98.1 F | BODY MASS INDEX: 31.17 KG/M2 | HEART RATE: 81 BPM | SYSTOLIC BLOOD PRESSURE: 112 MMHG | RESPIRATION RATE: 18 BRPM | DIASTOLIC BLOOD PRESSURE: 60 MMHG | WEIGHT: 170.4 LBS

## 2023-07-03 DIAGNOSIS — B37.2 CANDIDIASIS OF SKIN: ICD-10-CM

## 2023-07-03 DIAGNOSIS — K59.03 DRUG-INDUCED CONSTIPATION: ICD-10-CM

## 2023-07-03 DIAGNOSIS — S42.002D CLOSED NONDISPLACED FRACTURE OF LEFT CLAVICLE WITH ROUTINE HEALING, UNSPECIFIED PART OF CLAVICLE, SUBSEQUENT ENCOUNTER: Primary | ICD-10-CM

## 2023-07-03 DIAGNOSIS — I10 ESSENTIAL HYPERTENSION: ICD-10-CM

## 2023-07-03 PROCEDURE — 99309 SBSQ NF CARE MODERATE MDM 30: CPT | Performed by: NURSE PRACTITIONER

## 2023-07-03 NOTE — PROGRESS NOTES
Code Status:  DNR/DNI  Visit Type: RECHECK     Facility:   [facility] ([facility]) [[id number]]        History of Present Illness:   Hospital Admission Date: 6/21/2023    Hospital Discharge Date: 6/24/2023      Laurel Darden is a 73 year old female with a past medical history for DM2, pulmonary embolism on warfarin, CKD, Hx of CVA, HLD, HTN, DM2, LALO.  She was recently hospitalized after a fall in which she sustained a left clavicle fracture.  Orthopedics consulted and recommended nonoperative management, NWB and follow up xray in 2 weeks. She did have a high INR on admission and so she was given Vitamin K.  She had a decrease in hgb which subsequently stablized and discharge hgb was 9.9.     Today, she reports pain is improving.  She reports her constipation is now resolved and has been having loose stools so her miralax was controlled.  She states Ortho clinic called to schedule an appointment for follow up but she has not make that appointment yet.     Current Outpatient Medications   Medication     acetaminophen (TYLENOL) 325 MG tablet     alendronate (FOSAMAX) 70 MG tablet     ASPIRIN NOT PRESCRIBED (INTENTIONAL)     atorvastatin (LIPITOR) 80 MG tablet     bisacodyl (DULCOLAX) 10 MG suppository     blood glucose (ACCU-CHEK VIOLETTA PLUS) test strip     citalopram (CELEXA) 40 MG tablet     cyanocobalamin (VITAMIN B-12) 1000 MCG sublingual tablet     HYDROcodone-acetaminophen (NORCO) 5-325 MG tablet     lisinopril (ZESTRIL) 20 MG tablet     metFORMIN (GLUCOPHAGE) 500 MG tablet     oxybutynin ER (DITROPAN XL) 10 MG 24 hr tablet     polyethylene glycol (MIRALAX) 17 g packet     potassium chloride ER (KLOR-CON M) 10 MEQ CR tablet     senna-docusate (SENOKOT-S/PERICOLACE) 8.6-50 MG tablet     WARFARIN SODIUM PO     No current facility-administered medications for this visit.         Allergies   Allergen Reactions     Penicillins Shortness Of Breath and Hives     Immunization History   Administered Date(s)  Administered     COVID-19 Monovalent 18+ (Moderna) 05/19/2021, 06/16/2021     FLU 6-35 months 09/24/2010, 09/26/2011     FLUAD(HD)65+ QUAD 09/16/2021, 11/30/2022     Influenza (High Dose) 3 valent vaccine 08/27/2016     Influenza (IIV3) PF 10/13/2007, 10/23/2008, 10/10/2009, 09/24/2010, 09/22/2012, 11/29/2013     Influenza Vaccine >6 months (Alfuria,Fluzone) 11/15/2018     Influenza Vaccine, 6+MO IM (QUADRIVALENT W/PRESERVATIVES) 11/24/2017     Pneumo Conj 13-V (2010&after) 11/24/2017, 12/13/2019     Pneumococcal 23 valent 11/29/2013, 09/16/2021     TD,PF 7+ (Tenivac) 10/13/2007     Td (Adult), Adsorbed 10/13/2007     Zoster vaccine, live 04/10/2013       Review of Systems   Patient denies fever, chills, headache, lightheadedness, dizziness, rhinorrhea, cough, congestion, shortness of breath, chest pain, palpitations, n/v, diarrhea, constipation, change in appetite, change in sleep pattern, dysuria, frequency, burning or pain with urination.  Other than stated in HPI all other review of systems is negative.         Physical Exam  Vital signs:/60   Pulse 81   Temp 98.1  F (36.7  C)   Resp 18   Wt 77.3 kg (170 lb 6.4 oz)   BMI 31.17 kg/m     GENERAL APPEARANCE: Well developed, well nourished, in no acute distress.  HEENT: normocephalic, atraumatic  sclerae anicteric, conjunctivae clear and moist, EOM intact  LUNGS: Lung sounds CTA, no adventitious sounds, respiratory effort normal.  CARD: RRR, S1, S2, without murmurs, gallops, rubs  ABD: Soft, nondistended and nontender with normal bowel sounds.   MSK: left UE ROM limited due to pain and restrictions.    EXTREMITIES: left clavicle edema and ecchymosis resolved  NEURO: Alert and oriented x 3. Face is symmetric.  SKIN: ecchymosis of clavicle is now resolved.   PSYCH: euthymic          Labs:    Last Comprehensive Metabolic Panel:  Lab Results   Component Value Date     06/29/2023    POTASSIUM 4.7 06/29/2023    CHLORIDE 102 06/29/2023    CO2 26 06/29/2023     ANIONGAP 10 06/29/2023     (H) 06/29/2023    BUN 21.4 06/29/2023    CR 0.77 06/29/2023    GFRESTIMATED 80 06/29/2023    KENNETH 10.0 06/29/2023       Assessment/plan:   Closed nondisplaced fracture of left clavicle with routine healing, unspecified part of clavicle, subsequent encounter  Pain controlled, discussed discontinuing Norco in the next 2 weeks.  Nursing to set up ortho follow up.  PT/OT.     Drug-induced constipation  Improved, change miralax and senna S to prn.      Candidiasis of skin  Nystatin powder started last week, rash improving.      Essential hypertension  Stable, on no medications.  Monitor.     Type 2 diabetes mellitus without complication, without long-term current use of insulin (H)  BS controlled and so no longer checking Bs. continue with Metformin. Last A1c 6.0.  Will recheck A1c     H/O: CVA (cerebrovascular accident)  Stable, continue with statin and on Warfarin.     Chronic kidney disease, stage 3a (H)  Last GFR 85, monitor labs and avoid nephrotoxins.       Electronically signed by: Abbey Millard NP

## 2023-07-05 ENCOUNTER — TELEPHONE (OUTPATIENT)
Dept: GERIATRICS | Facility: CLINIC | Age: 73
End: 2023-07-05
Payer: COMMERCIAL

## 2023-07-05 LAB — INR PPP: 3.71 (ref 0.85–1.15)

## 2023-07-05 PROCEDURE — P9604 ONE-WAY ALLOW PRORATED TRIP: HCPCS | Performed by: FAMILY MEDICINE

## 2023-07-05 PROCEDURE — 85610 PROTHROMBIN TIME: CPT | Performed by: FAMILY MEDICINE

## 2023-07-05 PROCEDURE — 36415 COLL VENOUS BLD VENIPUNCTURE: CPT | Performed by: FAMILY MEDICINE

## 2023-07-05 NOTE — TELEPHONE ENCOUNTER
[facility] Triage Nurse INR     Provider: ZACK Daniels  Facility: [facility]   Facility Type:  TCU    Caller: Sherry  Call Back Number: [phone]  Reason for call: INR  Diagnosis/Goal: hx of PE    Todays INR: 3.71  Last INR 6/26 1.94(7.5mg Mon and Fri and 3.75mg AOD), 6/24 2.0(7.5mg Mon and Fri and 3.75mg AOD---home dose)    Heparin/Lovenox:  No  Currently on ABX?: No  Other interacting medication:  None  Missed or refused doses: No    Verbal Order/Direction given by Provider: Warfarin 2.5mg daily.  Next INR 7/7/23.      Provider Giving Order:  ZACK Daniels    Verbal Order given to: Sherry Quevedo RN

## 2023-07-06 ENCOUNTER — LAB REQUISITION (OUTPATIENT)
Dept: LAB | Facility: CLINIC | Age: 73
End: 2023-07-06
Payer: COMMERCIAL

## 2023-07-06 DIAGNOSIS — I82.409 ACUTE EMBOLISM AND THROMBOSIS OF UNSPECIFIED DEEP VEINS OF UNSPECIFIED LOWER EXTREMITY (H): ICD-10-CM

## 2023-07-07 ENCOUNTER — TELEPHONE (OUTPATIENT)
Dept: GERIATRICS | Facility: CLINIC | Age: 73
End: 2023-07-07

## 2023-07-07 ENCOUNTER — DISCHARGE SUMMARY NURSING HOME (OUTPATIENT)
Dept: GERIATRICS | Facility: CLINIC | Age: 73
End: 2023-07-07
Payer: COMMERCIAL

## 2023-07-07 VITALS
SYSTOLIC BLOOD PRESSURE: 134 MMHG | OXYGEN SATURATION: 96 % | TEMPERATURE: 97.9 F | BODY MASS INDEX: 31.62 KG/M2 | HEART RATE: 84 BPM | DIASTOLIC BLOOD PRESSURE: 71 MMHG | RESPIRATION RATE: 18 BRPM | WEIGHT: 172.9 LBS

## 2023-07-07 DIAGNOSIS — Z86.711 HISTORY OF PULMONARY EMBOLISM: ICD-10-CM

## 2023-07-07 DIAGNOSIS — N18.31 CHRONIC KIDNEY DISEASE, STAGE 3A (H): ICD-10-CM

## 2023-07-07 DIAGNOSIS — I10 ESSENTIAL HYPERTENSION: ICD-10-CM

## 2023-07-07 DIAGNOSIS — S42.002D CLOSED NONDISPLACED FRACTURE OF LEFT CLAVICLE WITH ROUTINE HEALING, UNSPECIFIED PART OF CLAVICLE, SUBSEQUENT ENCOUNTER: Primary | ICD-10-CM

## 2023-07-07 DIAGNOSIS — Z86.73 H/O: CVA (CEREBROVASCULAR ACCIDENT): ICD-10-CM

## 2023-07-07 DIAGNOSIS — K59.03 DRUG-INDUCED CONSTIPATION: ICD-10-CM

## 2023-07-07 DIAGNOSIS — E11.9 TYPE 2 DIABETES MELLITUS WITHOUT COMPLICATION, WITHOUT LONG-TERM CURRENT USE OF INSULIN (H): ICD-10-CM

## 2023-07-07 LAB — INR PPP: 2.68 (ref 0.85–1.15)

## 2023-07-07 PROCEDURE — 99316 NF DSCHRG MGMT 30 MIN+: CPT | Performed by: NURSE PRACTITIONER

## 2023-07-07 PROCEDURE — 85610 PROTHROMBIN TIME: CPT | Performed by: FAMILY MEDICINE

## 2023-07-07 PROCEDURE — 36415 COLL VENOUS BLD VENIPUNCTURE: CPT | Performed by: FAMILY MEDICINE

## 2023-07-07 PROCEDURE — P9604 ONE-WAY ALLOW PRORATED TRIP: HCPCS | Performed by: FAMILY MEDICINE

## 2023-07-07 NOTE — PROGRESS NOTES
Code Status:  DNR/DNI  Visit Type: Discharge Summary Nursing Home     Facility:   [facility] ([facility]) [[id number]]  PCP:  Tosha Henderson  [phone]       Admission Date to our Facility: 6/21/2023  Discharge Date from our Facility: 7/8/2023    Discharge Diagnosis:    Closed nondisplaced fracture of left clavicle with routine healing, unspecified part of clavicle, subsequent encounter  Drug-induced constipation  Essential hypertension  Type 2 diabetes mellitus without complication, without long-term current use of insulin (H)  H/O: CVA (cerebrovascular accident)  Chronic kidney disease, stage 3a (H)  History of pulmonary embolism        History of Present Illness: Laurel Darden is a 73 year old female with a past medical history for DM2, pulmonary embolism on warfarin, CKD, Hx of CVA, HLD, HTN, DM2, LALO.  She was recently hospitalized after a fall in which she sustained a left clavicle fracture.  Orthopedics consulted and recommended nonoperative management, NWB and follow up xray in 2 weeks. She did have a high INR on admission and so she was given Vitamin K.  She had a decrease in hgb which subsequently stablized and discharge hgb was 9.9.     Skilled Nursing Facility Course:  During TCU stay, endurance and strength improved and is ambulating with a walker.     Closed nondisplaced fracture of left clavicle with routine healing, unspecified part of clavicle, subsequent encounter  Seen by ortho this week and weight bearing status increased to 50% PWB of LUE and okay to wean from sling when at home. Pain controlled with minimal use of Norco.  Sent home with remaining 10 tabs.        Drug-induced constipation  Improved, change miralax and senna S to prn.       Candidiasis of skin  Nystatin powder, rash improving.       Essential hypertension  Stable, on no medications.  Monitor.      Type 2 diabetes mellitus without complication, without long-term current use of insulin (H)  BS controlled, did not check  BS in the facility.  Advised patient to check Bs 1 x daily at home like she was and continue with Metformin. Last A1c 6.0.       H/O: CVA (cerebrovascular accident)  Stable, continue with statin and on Warfarin.     H/O PE  INR was supratherapeutic this week at 3.7 after 1 week of home does 7.5mg MF and 3.75 AOD.  She was given 2.5mg the last 2 days and INR today good at 2.6.  Give 3.75mg daily and recheck INR 7/11.      Chronic kidney disease, stage 3a (H)  Last GFR 85, monitor labs and avoid nephrotoxins.      Discharge Plan:  Stable to discharge to home with home care services.  Advised patient to follow up with PCP in the next 7-10 days and follow up with ortho as dictated.     Discharge Medications:   Current Outpatient Medications   Medication     acetaminophen (TYLENOL) 325 MG tablet     alendronate (FOSAMAX) 70 MG tablet     ASPIRIN NOT PRESCRIBED (INTENTIONAL)     atorvastatin (LIPITOR) 80 MG tablet     bisacodyl (DULCOLAX) 10 MG suppository     blood glucose (ACCU-CHEK VIOLETTA PLUS) test strip     citalopram (CELEXA) 40 MG tablet     cyanocobalamin (VITAMIN B-12) 1000 MCG sublingual tablet     HYDROcodone-acetaminophen (NORCO) 5-325 MG tablet     lisinopril (ZESTRIL) 20 MG tablet     metFORMIN (GLUCOPHAGE) 500 MG tablet     oxybutynin ER (DITROPAN XL) 10 MG 24 hr tablet     polyethylene glycol (MIRALAX) 17 g packet     potassium chloride ER (KLOR-CON M) 10 MEQ CR tablet     senna-docusate (SENOKOT-S/PERICOLACE) 8.6-50 MG tablet     WARFARIN SODIUM PO     No current facility-administered medications for this visit.       Review of Systems   Patient denies fever, chills, headache, lightheadedness, dizziness, rhinorrhea, cough, congestion, shortness of breath, chest pain, palpitations, abdominal pain, n/v, diarrhea, constipation, change in appetite, change in sleep pattern, dysuria, frequency, burning or pain with urination.  Other than stated in HPI all other review of systems is negative.         Physical  Exam  Vital signs:/71   Pulse 84   Temp 97.9  F (36.6  C)   Resp 18   Wt 78.4 kg (172 lb 14.4 oz)   SpO2 96%   BMI 31.62 kg/m     GENERAL APPEARANCE: Well developed, well nourished, in no acute distress.  HEENT: normocephalic, atraumatic  sclerae anicteric, conjunctivae clear and moist, EOM intact  LUNGS: Lung sounds CTA, no adventitious sounds, respiratory effort normal.  CARD: RRR, S1, S2, without murmurs, gallops, rubs   ABD: Soft, nondistended and nontender with normal bowel sounds.   MSK: left shoulder ROM limited by pain.   EXTREMITIES: no edema of left shoulder, fading ecchymosis  NEURO: Alert and oriented x 3.  Face is symmetric.  SKIN:fading ecchymosis of left shoulder  PSYCH: euthymic          Labs:    Last Comprehensive Metabolic Panel:  Lab Results   Component Value Date     06/29/2023    POTASSIUM 4.7 06/29/2023    CHLORIDE 102 06/29/2023    CO2 26 06/29/2023    ANIONGAP 10 06/29/2023     (H) 06/29/2023    BUN 21.4 06/29/2023    CR 0.77 06/29/2023    GFRESTIMATED 80 06/29/2023    KENNETH 10.0 06/29/2023       Lab Results   Component Value Date    WBC 5.5 08/13/2021     Lab Results   Component Value Date    RBC 3.21 08/13/2021     Lab Results   Component Value Date    HGB 10.5 08/13/2021     Lab Results   Component Value Date    HCT 33.5 08/13/2021     Lab Results   Component Value Date     08/13/2021     Lab Results   Component Value Date    MCH 32.7 08/13/2021     Lab Results   Component Value Date    MCHC 31.3 08/13/2021     Lab Results   Component Value Date    RDW 13.2 08/13/2021     Lab Results   Component Value Date     08/13/2021           MEDICAL EQUIPMENT NEEDS:  NA      DISCHARGE PLAN/FACE TO FACE:  I certify that services are/were furnished while this patient was under the care of a physician and that a physician or an allowed non-physician practitioner (NPP), had a face-to-face encounter that meets the physician face-to-face encounter requirements. The  encounter was in whole, or in part, related to the primary reason for home health. The patient is confined to his/her home and needs intermittent skilled nursing, physical therapy, speech-language pathology, or the continued need for occupational therapy. A plan of care has been established by a physician and is periodically reviewed by a physician.    I certify that this patient is under my care and that I, or a nurse practitioner or physician's assistant working with me, had a face-to-face encounter that meets the physician face-to-face encounter requirements with this patient.   Date of Face-to-Face Encounter: 7/7/2023    I certify that, based on my findings, the following services are medically necessary home health services:  PT/OT    My clinical findings support the need for the above skilled services because:   PT/OT for ongoing strengthening and endurance    This patient is homebound because:   She requires maximum effort in order to get out into the community on a regular basis.     The patient is, or has been, under my care and I have initiated the establishment of the plan of care. This patient will be followed by a physician who will periodically review the plan of care.    35 total minutes spent with patient and SW in coordinating a discharge plan.     Electronically signed by: Abbey Millard NP

## 2023-07-07 NOTE — LETTER
7/7/2023        RE: Laurel Darden  [address]  [place] [address]        Code Status:  DNR/DNI  Visit Type: Discharge Summary Nursing Home     Facility:   [facility] ([facility]) [[id number]]  PCP:  Tosha Henderson  [phone]       Admission Date to our Facility: 6/21/2023  Discharge Date from our Facility: 7/8/2023    Discharge Diagnosis:    Closed nondisplaced fracture of left clavicle with routine healing, unspecified part of clavicle, subsequent encounter  Drug-induced constipation  Essential hypertension  Type 2 diabetes mellitus without complication, without long-term current use of insulin (H)  H/O: CVA (cerebrovascular accident)  Chronic kidney disease, stage 3a (H)  History of pulmonary embolism        History of Present Illness: Laurel Darden is a 73 year old female with a past medical history for DM2, pulmonary embolism on warfarin, CKD, Hx of CVA, HLD, HTN, DM2, LALO.  She was recently hospitalized after a fall in which she sustained a left clavicle fracture.  Orthopedics consulted and recommended nonoperative management, NWB and follow up xray in 2 weeks. She did have a high INR on admission and so she was given Vitamin K.  She had a decrease in hgb which subsequently stablized and discharge hgb was 9.9.     Skilled Nursing Facility Course:  During TCU stay, endurance and strength improved and is ambulating with a walker.     Closed nondisplaced fracture of left clavicle with routine healing, unspecified part of clavicle, subsequent encounter  Seen by ortho this week and weight bearing status increased to 50% PWB of LUE and okay to wean from sling when at home. Pain controlled with minimal use of Norco.  Sent home with remaining 10 tabs.        Drug-induced constipation  Improved, change miralax and senna S to prn.       Candidiasis of skin  Nystatin powder, rash improving.       Essential hypertension  Stable, on no medications.  Monitor.      Type 2 diabetes mellitus  without complication, without long-term current use of insulin (H)  BS controlled, did not check BS in the facility.  Advised patient to check Bs 1 x daily at home like she was and continue with Metformin. Last A1c 6.0.       H/O: CVA (cerebrovascular accident)  Stable, continue with statin and on Warfarin.     H/O PE  INR was supratherapeutic this week at 3.7 after 1 week of home does 7.5mg MF and 3.75 AOD.  She was given 2.5mg the last 2 days and INR today good at 2.6.  Give 3.75mg daily and recheck INR 7/11.      Chronic kidney disease, stage 3a (H)  Last GFR 85, monitor labs and avoid nephrotoxins.      Discharge Plan:  Stable to discharge to home with home care services.  Advised patient to follow up with PCP in the next 7-10 days and follow up with ortho as dictated.     Discharge Medications:   Current Outpatient Medications   Medication Sig Dispense Refill     acetaminophen (TYLENOL) 325 MG tablet Take 650 mg by mouth 3 times daily       alendronate (FOSAMAX) 70 MG tablet Take 70 mg by mouth once a week (on Friday)       ASPIRIN NOT PRESCRIBED (INTENTIONAL) Antiplatelet medication not prescribed intentionally due to {CHOOSE REASON BEFORE SIGNING!!!:551583}       atorvastatin (LIPITOR) 80 MG tablet [ATORVASTATIN (LIPITOR) 80 MG TABLET] Take 80 mg by mouth at bedtime.       bisacodyl (DULCOLAX) 10 MG suppository Place 10 mg rectally daily as needed for constipation       blood glucose (ACCU-CHEK VIOLETTA PLUS) test strip Use as directed to test blood sugar 4 times a day or as directed       citalopram (CELEXA) 40 MG tablet [CITALOPRAM (CELEXA) 40 MG TABLET] Take 40 mg by mouth daily.       cyanocobalamin (VITAMIN B-12) 1000 MCG sublingual tablet Place 1,000 mcg under the tongue daily       HYDROcodone-acetaminophen (NORCO) 5-325 MG tablet Take 1 tablet by mouth every 6 hours as needed for severe pain       lisinopril (ZESTRIL) 20 MG tablet Take 20 mg by mouth daily       metFORMIN (GLUCOPHAGE) 500 MG tablet Take  1,500 mg by mouth daily (with breakfast)       oxybutynin ER (DITROPAN XL) 10 MG 24 hr tablet Take 10 mg by mouth daily       polyethylene glycol (MIRALAX) 17 g packet Take 1 packet by mouth daily as needed for constipation       potassium chloride ER (KLOR-CON M) 10 MEQ CR tablet Take 10 mEq by mouth 2 times daily       senna-docusate (SENOKOT-S/PERICOLACE) 8.6-50 MG tablet Take 1 tablet by mouth daily as needed for constipation       WARFARIN SODIUM PO 7/7/23 INR 2.6 Warfarin 3.75mg daily and recheck INR 7/10.   7/5/23 INR 3.71  Warfarin 2.5mg daily.  Next INR 7/7/23.    6/26/23 INR 1.94  Cont 7.5mg Mon and Fri and 3.75mg AOD.  Next INR 7/5/23.         Review of Systems   Patient denies fever, chills, headache, lightheadedness, dizziness, rhinorrhea, cough, congestion, shortness of breath, chest pain, palpitations, abdominal pain, n/v, diarrhea, constipation, change in appetite, change in sleep pattern, dysuria, frequency, burning or pain with urination.  Other than stated in HPI all other review of systems is negative.         Physical Exam  Vital signs:/71   Pulse 84   Temp 97.9  F (36.6  C)   Resp 18   Wt 78.4 kg (172 lb 14.4 oz)   SpO2 96%   BMI 31.62 kg/m     GENERAL APPEARANCE: Well developed, well nourished, in no acute distress.  HEENT: normocephalic, atraumatic  sclerae anicteric, conjunctivae clear and moist, EOM intact  LUNGS: Lung sounds CTA, no adventitious sounds, respiratory effort normal.  CARD: RRR, S1, S2, without murmurs, gallops, rubs   ABD: Soft, nondistended and nontender with normal bowel sounds.   MSK: left shoulder ROM limited by pain.   EXTREMITIES: no edema of left shoulder, fading ecchymosis  NEURO: Alert and oriented x 3.  Face is symmetric.  SKIN:fading ecchymosis of left shoulder  PSYCH: euthymic          Labs:    Last Comprehensive Metabolic Panel:  Lab Results   Component Value Date     06/29/2023    POTASSIUM 4.7 06/29/2023    CHLORIDE 102 06/29/2023    CO2 26  06/29/2023    ANIONGAP 10 06/29/2023     (H) 06/29/2023    BUN 21.4 06/29/2023    CR 0.77 06/29/2023    GFRESTIMATED 80 06/29/2023    KENNETH 10.0 06/29/2023       Lab Results   Component Value Date    WBC 5.5 08/13/2021     Lab Results   Component Value Date    RBC 3.21 08/13/2021     Lab Results   Component Value Date    HGB 10.5 08/13/2021     Lab Results   Component Value Date    HCT 33.5 08/13/2021     Lab Results   Component Value Date     08/13/2021     Lab Results   Component Value Date    MCH 32.7 08/13/2021     Lab Results   Component Value Date    MCHC 31.3 08/13/2021     Lab Results   Component Value Date    RDW 13.2 08/13/2021     Lab Results   Component Value Date     08/13/2021           MEDICAL EQUIPMENT NEEDS:  NA      DISCHARGE PLAN/FACE TO FACE:  I certify that services are/were furnished while this patient was under the care of a physician and that a physician or an allowed non-physician practitioner (NPP), had a face-to-face encounter that meets the physician face-to-face encounter requirements. The encounter was in whole, or in part, related to the primary reason for home health. The patient is confined to his/her home and needs intermittent skilled nursing, physical therapy, speech-language pathology, or the continued need for occupational therapy. A plan of care has been established by a physician and is periodically reviewed by a physician.    I certify that this patient is under my care and that I, or a nurse practitioner or physician's assistant working with me, had a face-to-face encounter that meets the physician face-to-face encounter requirements with this patient.   Date of Face-to-Face Encounter: 7/7/2023    I certify that, based on my findings, the following services are medically necessary home health services:  PT/OT    My clinical findings support the need for the above skilled services because:   PT/OT for ongoing strengthening and endurance    This patient is  homebound because:   She requires maximum effort in order to get out into the community on a regular basis.     The patient is, or has been, under my care and I have initiated the establishment of the plan of care. This patient will be followed by a physician who will periodically review the plan of care.    35 total minutes spent with patient and SW in coordinating a discharge plan.     Electronically signed by: Abbey Millard NP          Sincerely,        Abbey Millard NP

## 2023-07-07 NOTE — TELEPHONE ENCOUNTER
[facility] Triage Nurse INR     Provider: ZACK Daniels  Facility: [facility]   Facility Type:  TCU    Caller: Nancy  Call Back Number: [phone]  Reason for call: INR  Diagnosis/Goal: hx of PE    Todays INR: 2.68  Last INR 7/5 3.71(2.5mg daily), 6/26 1.94(7.5mg Mon and Fri and 3.75mg AOD), 6/24 2.0(7.5mg Mon and Fri and 3.75mg AOD---home dose)    Heparin/Lovenox:  No  Currently on ABX?: No  Other interacting medication:  None  Missed or refused doses: No    Verbal Order/Direction given by Provider: Warfarin 3.75mg daily.  Next INR 7/10/23.      Provider Giving Order:  ZACK Daniels    Verbal Order given to: Nancy Quevedo RN

## 2023-07-07 NOTE — LETTER
7/7/2023        RE: Laurel Darden  [address]  [place] [address]        Code Status:  DNR/DNI  Visit Type: Discharge Summary Nursing Home     Facility:   [facility] ([facility]) [[id number]]  PCP:  Tosha Henderson  [phone]       Admission Date to our Facility: 6/21/2023  Discharge Date from our Facility: 7/8/2023    Discharge Diagnosis:    Closed nondisplaced fracture of left clavicle with routine healing, unspecified part of clavicle, subsequent encounter  Drug-induced constipation  Essential hypertension  Type 2 diabetes mellitus without complication, without long-term current use of insulin (H)  H/O: CVA (cerebrovascular accident)  Chronic kidney disease, stage 3a (H)  History of pulmonary embolism        History of Present Illness: Laurel Darden is a 73 year old female with a past medical history for DM2, pulmonary embolism on warfarin, CKD, Hx of CVA, HLD, HTN, DM2, LALO.  She was recently hospitalized after a fall in which she sustained a left clavicle fracture.  Orthopedics consulted and recommended nonoperative management, NWB and follow up xray in 2 weeks. She did have a high INR on admission and so she was given Vitamin K.  She had a decrease in hgb which subsequently stablized and discharge hgb was 9.9.     Skilled Nursing Facility Course:  During TCU stay, endurance and strength improved and is ambulating with a walker.     Closed nondisplaced fracture of left clavicle with routine healing, unspecified part of clavicle, subsequent encounter  Seen by ortho this week and weight bearing status increased to 50% PWB of LUE and okay to wean from sling when at home. Pain controlled with minimal use of Norco.  Sent home with remaining 10 tabs.        Drug-induced constipation  Improved, change miralax and senna S to prn.       Candidiasis of skin  Nystatin powder, rash improving.       Essential hypertension  Stable, on no medications.  Monitor.      Type 2 diabetes mellitus  without complication, without long-term current use of insulin (H)  BS controlled, did not check BS in the facility.  Advised patient to check Bs 1 x daily at home like she was and continue with Metformin. Last A1c 6.0.       H/O: CVA (cerebrovascular accident)  Stable, continue with statin and on Warfarin.     H/O PE  INR was supratherapeutic this week at 3.7 after 1 week of home does 7.5mg MF and 3.75 AOD.  She was given 2.5mg the last 2 days and INR today good at 2.6.  Give 3.75mg daily and recheck INR 7/11.      Chronic kidney disease, stage 3a (H)  Last GFR 85, monitor labs and avoid nephrotoxins.      Discharge Plan:  Stable to discharge to home with home care services.  Advised patient to follow up with PCP in the next 7-10 days and follow up with ortho as dictated.     Discharge Medications:   Current Outpatient Medications   Medication     acetaminophen (TYLENOL) 325 MG tablet     alendronate (FOSAMAX) 70 MG tablet     ASPIRIN NOT PRESCRIBED (INTENTIONAL)     atorvastatin (LIPITOR) 80 MG tablet     bisacodyl (DULCOLAX) 10 MG suppository     blood glucose (ACCU-CHEK VIOLETTA PLUS) test strip     citalopram (CELEXA) 40 MG tablet     cyanocobalamin (VITAMIN B-12) 1000 MCG sublingual tablet     HYDROcodone-acetaminophen (NORCO) 5-325 MG tablet     lisinopril (ZESTRIL) 20 MG tablet     metFORMIN (GLUCOPHAGE) 500 MG tablet     oxybutynin ER (DITROPAN XL) 10 MG 24 hr tablet     polyethylene glycol (MIRALAX) 17 g packet     potassium chloride ER (KLOR-CON M) 10 MEQ CR tablet     senna-docusate (SENOKOT-S/PERICOLACE) 8.6-50 MG tablet     WARFARIN SODIUM PO     No current facility-administered medications for this visit.       Review of Systems   Patient denies fever, chills, headache, lightheadedness, dizziness, rhinorrhea, cough, congestion, shortness of breath, chest pain, palpitations, abdominal pain, n/v, diarrhea, constipation, change in appetite, change in sleep pattern, dysuria, frequency, burning or pain with  urination.  Other than stated in HPI all other review of systems is negative.         Physical Exam  Vital signs:/71   Pulse 84   Temp 97.9  F (36.6  C)   Resp 18   Wt 78.4 kg (172 lb 14.4 oz)   SpO2 96%   BMI 31.62 kg/m     GENERAL APPEARANCE: Well developed, well nourished, in no acute distress.  HEENT: normocephalic, atraumatic  sclerae anicteric, conjunctivae clear and moist, EOM intact  LUNGS: Lung sounds CTA, no adventitious sounds, respiratory effort normal.  CARD: RRR, S1, S2, without murmurs, gallops, rubs   ABD: Soft, nondistended and nontender with normal bowel sounds.   MSK: left shoulder ROM limited by pain.   EXTREMITIES: no edema of left shoulder, fading ecchymosis  NEURO: Alert and oriented x 3.  Face is symmetric.  SKIN:fading ecchymosis of left shoulder  PSYCH: euthymic          Labs:    Last Comprehensive Metabolic Panel:  Lab Results   Component Value Date     06/29/2023    POTASSIUM 4.7 06/29/2023    CHLORIDE 102 06/29/2023    CO2 26 06/29/2023    ANIONGAP 10 06/29/2023     (H) 06/29/2023    BUN 21.4 06/29/2023    CR 0.77 06/29/2023    GFRESTIMATED 80 06/29/2023    KENNETH 10.0 06/29/2023       Lab Results   Component Value Date    WBC 5.5 08/13/2021     Lab Results   Component Value Date    RBC 3.21 08/13/2021     Lab Results   Component Value Date    HGB 10.5 08/13/2021     Lab Results   Component Value Date    HCT 33.5 08/13/2021     Lab Results   Component Value Date     08/13/2021     Lab Results   Component Value Date    MCH 32.7 08/13/2021     Lab Results   Component Value Date    MCHC 31.3 08/13/2021     Lab Results   Component Value Date    RDW 13.2 08/13/2021     Lab Results   Component Value Date     08/13/2021           MEDICAL EQUIPMENT NEEDS:  NA      DISCHARGE PLAN/FACE TO FACE:  I certify that services are/were furnished while this patient was under the care of a physician and that a physician or an allowed non-physician practitioner (NPP), had  a face-to-face encounter that meets the physician face-to-face encounter requirements. The encounter was in whole, or in part, related to the primary reason for home health. The patient is confined to his/her home and needs intermittent skilled nursing, physical therapy, speech-language pathology, or the continued need for occupational therapy. A plan of care has been established by a physician and is periodically reviewed by a physician.    I certify that this patient is under my care and that I, or a nurse practitioner or physician's assistant working with me, had a face-to-face encounter that meets the physician face-to-face encounter requirements with this patient.   Date of Face-to-Face Encounter: 7/7/2023    I certify that, based on my findings, the following services are medically necessary home health services:  PT/OT    My clinical findings support the need for the above skilled services because:   PT/OT for ongoing strengthening and endurance    This patient is homebound because:   She requires maximum effort in order to get out into the community on a regular basis.     The patient is, or has been, under my care and I have initiated the establishment of the plan of care. This patient will be followed by a physician who will periodically review the plan of care.    35 total minutes spent with patient and SW in coordinating a discharge plan.     Electronically signed by: Abbey Millard NP          Sincerely,        Abbey Millard NP

## 2023-07-07 NOTE — LETTER
7/7/2023        RE: Laurel Darden  [address]  [place] [address]        Code Status:  DNR/DNI  Visit Type: Discharge Summary Nursing Home     Facility:   [facility] ([facility]) [[id number]]  PCP:  Tosha Henderson  [phone]       Admission Date to our Facility: 6/21/2023  Discharge Date from our Facility: 7/8/2023    Discharge Diagnosis:    Closed nondisplaced fracture of left clavicle with routine healing, unspecified part of clavicle, subsequent encounter  Drug-induced constipation  Essential hypertension  Type 2 diabetes mellitus without complication, without long-term current use of insulin (H)  H/O: CVA (cerebrovascular accident)  Chronic kidney disease, stage 3a (H)  History of pulmonary embolism        History of Present Illness: Laurel Darden is a 73 year old female with a past medical history for DM2, pulmonary embolism on warfarin, CKD, Hx of CVA, HLD, HTN, DM2, LALO.  She was recently hospitalized after a fall in which she sustained a left clavicle fracture.  Orthopedics consulted and recommended nonoperative management, NWB and follow up xray in 2 weeks. She did have a high INR on admission and so she was given Vitamin K.  She had a decrease in hgb which subsequently stablized and discharge hgb was 9.9.     Skilled Nursing Facility Course:  During TCU stay, endurance and strength improved and is ambulating with a walker.     Closed nondisplaced fracture of left clavicle with routine healing, unspecified part of clavicle, subsequent encounter  Seen by ortho this week and weight bearing status increased to 50% PWB of LUE and okay to wean from sling when at home. Pain controlled with minimal use of Norco.  Sent home with remaining 10 tabs.        Drug-induced constipation  Improved, change miralax and senna S to prn.       Candidiasis of skin  Nystatin powder, rash improving.       Essential hypertension  Stable, on no medications.  Monitor.      Type 2 diabetes mellitus  without complication, without long-term current use of insulin (H)  BS controlled, did not check BS in the facility.  Advised patient to check Bs 1 x daily at home like she was and continue with Metformin. Last A1c 6.0.       H/O: CVA (cerebrovascular accident)  Stable, continue with statin and on Warfarin.     H/O PE  INR was supratherapeutic this week at 3.7 after 1 week of home does 7.5mg MF and 3.75 AOD.  She was given 2.5mg the last 2 days and INR today good at 2.6.  Give 3.75mg daily and recheck INR 7/11.      Chronic kidney disease, stage 3a (H)  Last GFR 85, monitor labs and avoid nephrotoxins.      Discharge Plan:  Stable to discharge to home with home care services.  Advised patient to follow up with PCP in the next 7-10 days and follow up with ortho as dictated.     Discharge Medications:   Current Outpatient Medications   Medication Sig Dispense Refill     acetaminophen (TYLENOL) 325 MG tablet Take 650 mg by mouth 3 times daily       alendronate (FOSAMAX) 70 MG tablet Take 70 mg by mouth once a week (on Friday)       ASPIRIN NOT PRESCRIBED (INTENTIONAL) Antiplatelet medication not prescribed intentionally due to {CHOOSE REASON BEFORE SIGNING!!!:155152}       atorvastatin (LIPITOR) 80 MG tablet [ATORVASTATIN (LIPITOR) 80 MG TABLET] Take 80 mg by mouth at bedtime.       bisacodyl (DULCOLAX) 10 MG suppository Place 10 mg rectally daily as needed for constipation       blood glucose (ACCU-CHEK VIOLETTA PLUS) test strip Use as directed to test blood sugar 4 times a day or as directed       citalopram (CELEXA) 40 MG tablet [CITALOPRAM (CELEXA) 40 MG TABLET] Take 40 mg by mouth daily.       cyanocobalamin (VITAMIN B-12) 1000 MCG sublingual tablet Place 1,000 mcg under the tongue daily       HYDROcodone-acetaminophen (NORCO) 5-325 MG tablet Take 1 tablet by mouth every 6 hours as needed for severe pain       lisinopril (ZESTRIL) 20 MG tablet Take 20 mg by mouth daily       metFORMIN (GLUCOPHAGE) 500 MG tablet Take  1,500 mg by mouth daily (with breakfast)       oxybutynin ER (DITROPAN XL) 10 MG 24 hr tablet Take 10 mg by mouth daily       polyethylene glycol (MIRALAX) 17 g packet Take 1 packet by mouth daily as needed for constipation       potassium chloride ER (KLOR-CON M) 10 MEQ CR tablet Take 10 mEq by mouth 2 times daily       senna-docusate (SENOKOT-S/PERICOLACE) 8.6-50 MG tablet Take 1 tablet by mouth daily as needed for constipation       WARFARIN SODIUM PO 7/7/23 INR 2.6 Warfarin 3.75mg daily and recheck INR 7/10.   7/5/23 INR 3.71  Warfarin 2.5mg daily.  Next INR 7/7/23.    6/26/23 INR 1.94  Cont 7.5mg Mon and Fri and 3.75mg AOD.  Next INR 7/5/23.         Review of Systems   Patient denies fever, chills, headache, lightheadedness, dizziness, rhinorrhea, cough, congestion, shortness of breath, chest pain, palpitations, abdominal pain, n/v, diarrhea, constipation, change in appetite, change in sleep pattern, dysuria, frequency, burning or pain with urination.  Other than stated in HPI all other review of systems is negative.         Physical Exam  Vital signs:/71   Pulse 84   Temp 97.9  F (36.6  C)   Resp 18   Wt 78.4 kg (172 lb 14.4 oz)   SpO2 96%   BMI 31.62 kg/m     GENERAL APPEARANCE: Well developed, well nourished, in no acute distress.  HEENT: normocephalic, atraumatic  sclerae anicteric, conjunctivae clear and moist, EOM intact  LUNGS: Lung sounds CTA, no adventitious sounds, respiratory effort normal.  CARD: RRR, S1, S2, without murmurs, gallops, rubs   ABD: Soft, nondistended and nontender with normal bowel sounds.   MSK: left shoulder ROM limited by pain.   EXTREMITIES: no edema of left shoulder, fading ecchymosis  NEURO: Alert and oriented x 3.  Face is symmetric.  SKIN:fading ecchymosis of left shoulder  PSYCH: euthymic          Labs:    Last Comprehensive Metabolic Panel:  Lab Results   Component Value Date     06/29/2023    POTASSIUM 4.7 06/29/2023    CHLORIDE 102 06/29/2023    CO2 26  06/29/2023    ANIONGAP 10 06/29/2023     (H) 06/29/2023    BUN 21.4 06/29/2023    CR 0.77 06/29/2023    GFRESTIMATED 80 06/29/2023    KENNETH 10.0 06/29/2023       Lab Results   Component Value Date    WBC 5.5 08/13/2021     Lab Results   Component Value Date    RBC 3.21 08/13/2021     Lab Results   Component Value Date    HGB 10.5 08/13/2021     Lab Results   Component Value Date    HCT 33.5 08/13/2021     Lab Results   Component Value Date     08/13/2021     Lab Results   Component Value Date    MCH 32.7 08/13/2021     Lab Results   Component Value Date    MCHC 31.3 08/13/2021     Lab Results   Component Value Date    RDW 13.2 08/13/2021     Lab Results   Component Value Date     08/13/2021           MEDICAL EQUIPMENT NEEDS:  NA      DISCHARGE PLAN/FACE TO FACE:  I certify that services are/were furnished while this patient was under the care of a physician and that a physician or an allowed non-physician practitioner (NPP), had a face-to-face encounter that meets the physician face-to-face encounter requirements. The encounter was in whole, or in part, related to the primary reason for home health. The patient is confined to his/her home and needs intermittent skilled nursing, physical therapy, speech-language pathology, or the continued need for occupational therapy. A plan of care has been established by a physician and is periodically reviewed by a physician.    I certify that this patient is under my care and that I, or a nurse practitioner or physician's assistant working with me, had a face-to-face encounter that meets the physician face-to-face encounter requirements with this patient.   Date of Face-to-Face Encounter: 7/7/2023    I certify that, based on my findings, the following services are medically necessary home health services:  PT/OT    My clinical findings support the need for the above skilled services because:   PT/OT for ongoing strengthening and endurance    This patient is  homebound because:   She requires maximum effort in order to get out into the community on a regular basis.     The patient is, or has been, under my care and I have initiated the establishment of the plan of care. This patient will be followed by a physician who will periodically review the plan of care.    35 total minutes spent with patient and SW in coordinating a discharge plan.     Electronically signed by: Abbey Millard NP          Sincerely,        Abbey Millard NP

## 2023-07-09 ENCOUNTER — LAB REQUISITION (OUTPATIENT)
Dept: LAB | Facility: CLINIC | Age: 73
End: 2023-07-09
Payer: COMMERCIAL

## 2023-07-09 DIAGNOSIS — I82.409 ACUTE EMBOLISM AND THROMBOSIS OF UNSPECIFIED DEEP VEINS OF UNSPECIFIED LOWER EXTREMITY (H): ICD-10-CM

## 2023-07-10 ENCOUNTER — TELEPHONE (OUTPATIENT)
Dept: GERIATRICS | Facility: CLINIC | Age: 73
End: 2023-07-10
Payer: COMMERCIAL

## 2023-07-10 LAB — INR PPP: 2.37 (ref 0.85–1.15)

## 2023-07-10 PROCEDURE — 36415 COLL VENOUS BLD VENIPUNCTURE: CPT | Mod: ORL | Performed by: FAMILY MEDICINE

## 2023-07-10 PROCEDURE — P9604 ONE-WAY ALLOW PRORATED TRIP: HCPCS | Mod: ORL | Performed by: FAMILY MEDICINE

## 2023-07-10 PROCEDURE — 85610 PROTHROMBIN TIME: CPT | Mod: ORL | Performed by: FAMILY MEDICINE

## 2023-07-10 NOTE — TELEPHONE ENCOUNTER
[facility] Triage Nurse INR     Provider: ZACK Daniels  Facility: [facility]   Facility Type:  TCU    Caller: Phuc  Call Back Number: [phone]  Reason for call: INR  Diagnosis/Goal: hx of PE    Todays INR: 2.37  Last INR 7/7 2.68(3.75mg daily), 7/5 3.71(2.5mg daily), 6/26 1.94(7.5mg Mon and Fri and 3.75mg AOD), 6/24 2.0(7.5mg Mon and Fri and 3.75mg AOD---home dose)    Heparin/Lovenox:  No  Currently on ABX?: No  Other interacting medication:  None  Missed or refused doses: No         Verbal Order/Direction given by Provider: Continue Warfarin 3.75mg daily.  Next INR 7/18/23.      Provider Giving Order:  ZACK Daniels    Verbal Order given to: Phuc Quevedo RN

## 2023-07-23 ENCOUNTER — LAB REQUISITION (OUTPATIENT)
Dept: LAB | Facility: CLINIC | Age: 73
End: 2023-07-23
Payer: COMMERCIAL

## 2023-07-23 DIAGNOSIS — E11.9 TYPE 2 DIABETES MELLITUS WITHOUT COMPLICATIONS (H): ICD-10-CM

## 2023-12-13 ENCOUNTER — LAB REQUISITION (OUTPATIENT)
Dept: LAB | Facility: CLINIC | Age: 73
End: 2023-12-13
Payer: COMMERCIAL

## 2023-12-13 DIAGNOSIS — E78.2 MIXED HYPERLIPIDEMIA: ICD-10-CM

## 2023-12-13 DIAGNOSIS — M81.0 AGE-RELATED OSTEOPOROSIS WITHOUT CURRENT PATHOLOGICAL FRACTURE: ICD-10-CM

## 2023-12-13 PROCEDURE — 80061 LIPID PANEL: CPT | Mod: ORL | Performed by: PHYSICIAN ASSISTANT

## 2023-12-13 PROCEDURE — 82306 VITAMIN D 25 HYDROXY: CPT | Mod: ORL | Performed by: PHYSICIAN ASSISTANT

## 2023-12-13 PROCEDURE — 80053 COMPREHEN METABOLIC PANEL: CPT | Mod: ORL | Performed by: PHYSICIAN ASSISTANT

## 2023-12-14 LAB
ALBUMIN SERPL BCG-MCNC: 4.1 G/DL (ref 3.5–5.2)
ALP SERPL-CCNC: 48 U/L (ref 40–150)
ALT SERPL W P-5'-P-CCNC: 16 U/L (ref 0–50)
ANION GAP SERPL CALCULATED.3IONS-SCNC: 11 MMOL/L (ref 7–15)
AST SERPL W P-5'-P-CCNC: 18 U/L (ref 0–45)
BILIRUB SERPL-MCNC: 1 MG/DL
BUN SERPL-MCNC: 14.6 MG/DL (ref 8–23)
CALCIUM SERPL-MCNC: 9.8 MG/DL (ref 8.8–10.2)
CHLORIDE SERPL-SCNC: 101 MMOL/L (ref 98–107)
CHOLEST SERPL-MCNC: 139 MG/DL
CREAT SERPL-MCNC: 0.83 MG/DL (ref 0.51–0.95)
DEPRECATED HCO3 PLAS-SCNC: 28 MMOL/L (ref 22–29)
EGFRCR SERPLBLD CKD-EPI 2021: 74 ML/MIN/1.73M2
FASTING STATUS PATIENT QL REPORTED: NORMAL
GLUCOSE SERPL-MCNC: 94 MG/DL (ref 70–99)
HDLC SERPL-MCNC: 65 MG/DL
LDLC SERPL CALC-MCNC: 55 MG/DL
NONHDLC SERPL-MCNC: 74 MG/DL
POTASSIUM SERPL-SCNC: 4 MMOL/L (ref 3.4–5.3)
PROT SERPL-MCNC: 6.8 G/DL (ref 6.4–8.3)
SODIUM SERPL-SCNC: 140 MMOL/L (ref 135–145)
TRIGL SERPL-MCNC: 93 MG/DL
VIT D+METAB SERPL-MCNC: 44 NG/ML (ref 20–50)

## 2024-03-06 ENCOUNTER — PATIENT OUTREACH (OUTPATIENT)
Dept: CARE COORDINATION | Facility: CLINIC | Age: 74
End: 2024-03-06
Payer: COMMERCIAL

## 2024-03-06 NOTE — PROGRESS NOTES
Clinic Care Coordination Contact  Care Team Conversations    Patient identified for care management outreach, however patient is not on a value based contract so cannot complete outreach. Will escalate to clinic staff if specific needs or resources are indicated.    FRANCESCA CC consulted for next steps.     NATALIA Enciso  Social Work Care Coordinator - [facility]  Care Coordination  [email or web address]  Cell Phone: [phone]  Gender pronouns: she/her  Employed by [employer]

## 2024-09-12 ENCOUNTER — LAB REQUISITION (OUTPATIENT)
Dept: LAB | Facility: CLINIC | Age: 74
End: 2024-09-12
Payer: COMMERCIAL

## 2024-09-12 DIAGNOSIS — R41.3 OTHER AMNESIA: ICD-10-CM

## 2024-09-12 DIAGNOSIS — R30.0 DYSURIA: ICD-10-CM

## 2024-09-12 LAB
ALBUMIN SERPL BCG-MCNC: 4 G/DL (ref 3.5–5.2)
ALP SERPL-CCNC: 37 U/L (ref 40–150)
ALT SERPL W P-5'-P-CCNC: 19 U/L (ref 0–50)
ANION GAP SERPL CALCULATED.3IONS-SCNC: 9 MMOL/L (ref 7–15)
AST SERPL W P-5'-P-CCNC: 23 U/L (ref 0–45)
B BURGDOR IGG+IGM SER QL: 0.09
BASOPHILS # BLD AUTO: 0 10E3/UL (ref 0–0.2)
BASOPHILS NFR BLD AUTO: 0 %
BILIRUB SERPL-MCNC: 1 MG/DL
BUN SERPL-MCNC: 19 MG/DL (ref 8–23)
CALCIUM SERPL-MCNC: 9.8 MG/DL (ref 8.8–10.4)
CHLORIDE SERPL-SCNC: 101 MMOL/L (ref 98–107)
CREAT SERPL-MCNC: 1 MG/DL (ref 0.51–0.95)
EGFRCR SERPLBLD CKD-EPI 2021: 58 ML/MIN/1.73M2
EOSINOPHIL # BLD AUTO: 0.1 10E3/UL (ref 0–0.7)
EOSINOPHIL NFR BLD AUTO: 2 %
ERYTHROCYTE [DISTWIDTH] IN BLOOD BY AUTOMATED COUNT: 13.3 % (ref 10–15)
FOLATE SERPL-MCNC: 3.8 NG/ML (ref 4.6–34.8)
GLUCOSE SERPL-MCNC: 88 MG/DL (ref 70–99)
HCO3 SERPL-SCNC: 28 MMOL/L (ref 22–29)
HCT VFR BLD AUTO: 33.9 % (ref 35–47)
HGB BLD-MCNC: 10.6 G/DL (ref 11.7–15.7)
IMM GRANULOCYTES # BLD: 0 10E3/UL
IMM GRANULOCYTES NFR BLD: 1 %
LYMPHOCYTES # BLD AUTO: 0.7 10E3/UL (ref 0.8–5.3)
LYMPHOCYTES NFR BLD AUTO: 11 %
MCH RBC QN AUTO: 33.2 PG (ref 26.5–33)
MCHC RBC AUTO-ENTMCNC: 31.3 G/DL (ref 31.5–36.5)
MCV RBC AUTO: 106 FL (ref 78–100)
MONOCYTES # BLD AUTO: 0.4 10E3/UL (ref 0–1.3)
MONOCYTES NFR BLD AUTO: 7 %
NEUTROPHILS # BLD AUTO: 5.1 10E3/UL (ref 1.6–8.3)
NEUTROPHILS NFR BLD AUTO: 79 %
NRBC # BLD AUTO: 0 10E3/UL
NRBC BLD AUTO-RTO: 0 /100
PLATELET # BLD AUTO: 206 10E3/UL (ref 150–450)
POTASSIUM SERPL-SCNC: 4.4 MMOL/L (ref 3.4–5.3)
PROT SERPL-MCNC: 6.7 G/DL (ref 6.4–8.3)
RBC # BLD AUTO: 3.19 10E6/UL (ref 3.8–5.2)
SODIUM SERPL-SCNC: 138 MMOL/L (ref 135–145)
TSH SERPL DL<=0.005 MIU/L-ACNC: 1.15 UIU/ML (ref 0.3–4.2)
VIT B12 SERPL-MCNC: 1266 PG/ML (ref 232–1245)
WBC # BLD AUTO: 6.4 10E3/UL (ref 4–11)

## 2024-09-12 PROCEDURE — 86618 LYME DISEASE ANTIBODY: CPT | Mod: ORL | Performed by: PHYSICIAN ASSISTANT

## 2024-09-12 PROCEDURE — 80053 COMPREHEN METABOLIC PANEL: CPT | Mod: ORL | Performed by: PHYSICIAN ASSISTANT

## 2024-09-12 PROCEDURE — 84443 ASSAY THYROID STIM HORMONE: CPT | Mod: ORL | Performed by: PHYSICIAN ASSISTANT

## 2024-09-12 PROCEDURE — 82607 VITAMIN B-12: CPT | Mod: ORL | Performed by: PHYSICIAN ASSISTANT

## 2024-09-12 PROCEDURE — 87086 URINE CULTURE/COLONY COUNT: CPT | Mod: ORL | Performed by: PHYSICIAN ASSISTANT

## 2024-09-12 PROCEDURE — 85025 COMPLETE CBC W/AUTO DIFF WBC: CPT | Mod: ORL | Performed by: PHYSICIAN ASSISTANT

## 2024-09-12 PROCEDURE — 82746 ASSAY OF FOLIC ACID SERUM: CPT | Mod: ORL | Performed by: PHYSICIAN ASSISTANT

## 2024-09-13 LAB — BACTERIA UR CULT: NORMAL

## 2024-10-31 ENCOUNTER — LAB REQUISITION (OUTPATIENT)
Dept: LAB | Facility: CLINIC | Age: 74
End: 2024-10-31
Payer: COMMERCIAL

## 2024-10-31 DIAGNOSIS — E53.8 DEFICIENCY OF OTHER SPECIFIED B GROUP VITAMINS: ICD-10-CM

## 2024-10-31 DIAGNOSIS — N28.9 DISORDER OF KIDNEY AND URETER, UNSPECIFIED: ICD-10-CM

## 2024-10-31 LAB
ERYTHROCYTE [DISTWIDTH] IN BLOOD BY AUTOMATED COUNT: 12.8 % (ref 10–15)
FOLATE SERPL-MCNC: >40 NG/ML (ref 4.6–34.8)
HCT VFR BLD AUTO: 37.3 % (ref 35–47)
HGB BLD-MCNC: 11.9 G/DL (ref 11.7–15.7)
MCH RBC QN AUTO: 33.1 PG (ref 26.5–33)
MCHC RBC AUTO-ENTMCNC: 31.9 G/DL (ref 31.5–36.5)
MCV RBC AUTO: 104 FL (ref 78–100)
PLATELET # BLD AUTO: 235 10E3/UL (ref 150–450)
RBC # BLD AUTO: 3.59 10E6/UL (ref 3.8–5.2)
WBC # BLD AUTO: 8.2 10E3/UL (ref 4–11)

## 2024-10-31 PROCEDURE — 85027 COMPLETE CBC AUTOMATED: CPT | Mod: ORL | Performed by: PHYSICIAN ASSISTANT

## 2024-10-31 PROCEDURE — 80048 BASIC METABOLIC PNL TOTAL CA: CPT | Mod: ORL | Performed by: PHYSICIAN ASSISTANT

## 2024-10-31 PROCEDURE — 82746 ASSAY OF FOLIC ACID SERUM: CPT | Mod: ORL | Performed by: PHYSICIAN ASSISTANT

## 2024-11-01 LAB
ANION GAP SERPL CALCULATED.3IONS-SCNC: 10 MMOL/L (ref 7–15)
BUN SERPL-MCNC: 20 MG/DL (ref 8–23)
CALCIUM SERPL-MCNC: 9.8 MG/DL (ref 8.8–10.4)
CHLORIDE SERPL-SCNC: 99 MMOL/L (ref 98–107)
CREAT SERPL-MCNC: 1.08 MG/DL (ref 0.51–0.95)
EGFRCR SERPLBLD CKD-EPI 2021: 53 ML/MIN/1.73M2
GLUCOSE SERPL-MCNC: 96 MG/DL (ref 70–99)
HCO3 SERPL-SCNC: 28 MMOL/L (ref 22–29)
POTASSIUM SERPL-SCNC: 4.3 MMOL/L (ref 3.4–5.3)
SODIUM SERPL-SCNC: 137 MMOL/L (ref 135–145)

## 2024-12-13 ENCOUNTER — APPOINTMENT (OUTPATIENT)
Dept: CT IMAGING | Facility: HOSPITAL | Age: 74
End: 2024-12-13
Attending: EMERGENCY MEDICINE
Payer: COMMERCIAL

## 2024-12-13 ENCOUNTER — HOSPITAL ENCOUNTER (EMERGENCY)
Facility: HOSPITAL | Age: 74
Discharge: HOME | End: 2024-12-13
Attending: EMERGENCY MEDICINE | Admitting: EMERGENCY MEDICINE
Payer: COMMERCIAL

## 2024-12-13 VITALS
HEART RATE: 80 BPM | OXYGEN SATURATION: 99 % | RESPIRATION RATE: 18 BRPM | SYSTOLIC BLOOD PRESSURE: 158 MMHG | BODY MASS INDEX: 29.44 KG/M2 | TEMPERATURE: 98.3 F | WEIGHT: 160 LBS | DIASTOLIC BLOOD PRESSURE: 73 MMHG | HEIGHT: 62 IN

## 2024-12-13 DIAGNOSIS — Z79.01 ANTICOAGULATED ON COUMADIN: ICD-10-CM

## 2024-12-13 DIAGNOSIS — S00.81XA FACIAL ABRASION, INITIAL ENCOUNTER: Primary | ICD-10-CM

## 2024-12-13 DIAGNOSIS — R79.1 SUPRATHERAPEUTIC INR: ICD-10-CM
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
## 2024-12-13 DIAGNOSIS — S00.83XA FACIAL CONTUSION, INITIAL ENCOUNTER: ICD-10-CM
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
## 2024-12-13 DIAGNOSIS — S09.90XA INJURY OF HEAD, INITIAL ENCOUNTER: ICD-10-CM

## 2024-12-13 LAB
HOLD SPECIMEN: NORMAL
INR PPP: 1.94 (ref 0.85–1.15)

## 2024-12-13 PROCEDURE — 250N000009 HC RX 250: Performed by: EMERGENCY MEDICINE

## 2024-12-13 PROCEDURE — 70486 CT MAXILLOFACIAL W/O DYE: CPT

## 2024-12-13 PROCEDURE — 250N000013 HC RX MED GY IP 250 OP 250 PS 637: Performed by: EMERGENCY MEDICINE

## 2024-12-13 PROCEDURE — 85610 PROTHROMBIN TIME: CPT | Performed by: EMERGENCY MEDICINE

## 2024-12-13 PROCEDURE — 36415 COLL VENOUS BLD VENIPUNCTURE: CPT | Performed by: EMERGENCY MEDICINE

## 2024-12-13 PROCEDURE — 99285 EMERGENCY DEPT VISIT HI MDM: CPT | Mod: 25

## 2024-12-13 PROCEDURE — 72125 CT NECK SPINE W/O DYE: CPT

## 2024-12-13 PROCEDURE — 70450 CT HEAD/BRAIN W/O DYE: CPT

## 2024-12-13 RX ORDER — GINSENG 100 MG
CAPSULE ORAL ONCE
Status: COMPLETED | OUTPATIENT
Start: 2024-12-13 | End: 2024-12-13

## 2024-12-13 RX ORDER — ACETAMINOPHEN 325 MG/1
650 TABLET ORAL ONCE
Status: COMPLETED | OUTPATIENT
Start: 2024-12-13 | End: 2024-12-13

## 2024-12-13 RX ADMIN — ACETAMINOPHEN 650 MG: 325 TABLET ORAL at 15:37

## 2024-12-13 RX ADMIN — BACITRACIN: 500 OINTMENT TOPICAL at 16:51

## 2024-12-13 ASSESSMENT — ACTIVITIES OF DAILY LIVING (ADL)
ADLS_ACUITY_SCORE: 41
ADLS_ACUITY_SCORE: 41

## 2024-12-13 ASSESSMENT — COLUMBIA-SUICIDE SEVERITY RATING SCALE - C-SSRS
2. HAVE YOU ACTUALLY HAD ANY THOUGHTS OF KILLING YOURSELF IN THE PAST MONTH?: NO
6. HAVE YOU EVER DONE ANYTHING, STARTED TO DO ANYTHING, OR PREPARED TO DO ANYTHING TO END YOUR LIFE?: NO
1. IN THE PAST MONTH, HAVE YOU WISHED YOU WERE DEAD OR WISHED YOU COULD GO TO SLEEP AND NOT WAKE UP?: NO

## 2024-12-13 NOTE — ED PROVIDER NOTES
EMERGENCY DEPARTMENT ENCOUNTER      NAME: Laurel Darden  AGE: 74 year old female  YOB: 1950  MRN: [id number]  EVALUATION DATE & TIME: 12/13/2024  3:29 PM    PCP: Tosha Trevizo    ED PROVIDER: Angie Borja M.D.      CHIEF COMPLAINT     Chief Complaint   Patient presents with    Fall         FINAL IMPRESSION:     1. Fall at home, initial encounter    2. Injury of head, initial encounter    3. Facial contusion, initial encounter    4. Facial abrasion, initial encounter    5. Anticoagulated on Coumadin    6. Supratherapeutic INR          MEDICAL DECISION MAKING:     ED Course as of 12/13/24 1726   Fri Dec 13, 2024   1634 CT Head independently interpreted by me reveals no intracranial hemorrhage.   1635 75 yo presents here with her family member and caregiver.  She was at a restaurant on she usually has a walker but that she could do without the walker and was trying to get out of the bathroom after she finished using it it was hard for her to open the door and fell landing on the right side.  She hit her head did not lose any consciousness.  She is on warfarin for previous history of PE and CVA.   1636 She is otherwise have been doing well had a cold recently.  Otherwise no chest pain dizziness   1636 No Double vision headache or near syncope prior to falling she said this was an accident   1636 Patient is well-appearing cooperative and pleasant has a contusion of the left cheek no hyphema no hemotympanum no midline cervical thoracic lumbar tenderness palpation full range of motion of the upper and lower extremities GCS of 15.   1636 Patient reports include but not limited to trauma arrhythmia acute coronary syndrome supratherapeutic INR among others.   1636 Patient is clear that she was doing well and was not using her walker and had trouble opening the door and asked why she fell.  Therefore no lab work or EKGs will be done.   1637 Trauma evaluation included CT head independently interpreted  by me no hemorrhage.  CT C-spine CT face no acute abnormalities.   1637 INR is 1.94.   1639 On [facility] Records last tetanus was 2004 by patient and her caregiver said that she had a tetanus within the last year.   1725 Caregiver was able to find that she had a tetanus within this year.  Patient discharged ambulatory in stable condition.       Medical Decision Making    History:  Supplemental history from: Caregiver daughter-in-law.  External Record(s) reviewed: [employer] 2/7/2024 left shoulder pain anticoagulated clavicle fracture tension type 2 diabetes CVA chronic kidney disease history of PE    Work Up:  Chart documentation includes differential considered and any EKGs or imaging independently interpreted by provider, where specified.  In additional to work up documented, I considered the following work up: EKG and labs.  This was an accidental fall.  Was doing well prior to it.    External consultation:  Discussion of management with another provider: N/A    Complicating factors:  Care impacted by chronic illness: History of PE, History of Falls      Disposition considerations: Discharge. I recommended the patient continue their current prescription strength medication(s): Warfarin. I considered admission, but discharged the patient after share decision making conversation.    Adult Minor Head Trauma:Age 65 years or older        ED COURSE     3:34 PM Met with patient for initial interview and exam.     At the conclusion of the encounter I discussed the results of all of the tests and the disposition. The questions were answered. The patient and caregiver acknowledged understanding and was agreeable with the care plan.         MEDICATIONS GIVEN IN THE EMERGENCY:     Medications   acetaminophen (TYLENOL) tablet 650 mg (650 mg Oral $Given 12/13/24 3598)   bacitracin ointment ( Topical $Given 12/13/24 0512)       NEW PRESCRIPTIONS STARTED AT TODAY'S ER VISIT     Discharge Medication List as of 12/13/2024  4:55 PM              =================================================================    HPI     Patient information was obtained from: Patient and Caregiver (Patient's son's significant other)    Use of : N/A         Laurel Darden is a 74 year old female who presents by walk in for evaluation after fall.     Patient was at lunch with friends and got up to use the bathroom, leaving walker outside of bathroom. When she was trying to leave, she stumbled backwards and fell. Fell on her left side, without loss of consciousness. Friends and caregiver helped her up. Denies headache, double vision, or feeling faint. On Warfarin.     Endorses previous stroke leaving deficit of left sided weakness. Endorses 2 years ago clavicle fracture.      REVIEW OF SYSTEMS   Review of Systems   SEE HPI    PAST MEDICAL HISTORY:     Past Medical History:   Diagnosis Date    Age related osteoporosis 6/18/2021    Chronic kidney disease, stage 3a (H) 6/18/2021    Essential hypertension 1/21/2008    H/O: CVA (cerebrovascular accident) 4/12/2010    Malignant melanoma of skin of upper limb, including shoulder (H) 5/8/2008    Mixed hyperlipidemia 6/18/2021    Obesity 6/18/2021    Recurrent major depression in full remission (H) 6/18/2021    Sleep apnea 6/18/2021    Type 2 diabetes mellitus without complication (H) 1/21/2008       PAST SURGICAL HISTORY:     Past Surgical History:   Procedure Laterality Date    HYSTERECTOMY      IR PULMONARY ANGIOGRAM BILATERAL  6/18/2021    IR PULMONARY ANGIOGRAM BILATERAL  6/18/2021    SKIN CANCER EXCISION      TONSILLECTOMY & ADENOIDECTOMY           CURRENT MEDICATIONS:   acetaminophen (TYLENOL) 325 MG tablet  alendronate (FOSAMAX) 70 MG tablet  ASPIRIN NOT PRESCRIBED (INTENTIONAL)  atorvastatin (LIPITOR) 80 MG tablet  bisacodyl (DULCOLAX) 10 MG suppository  blood glucose (ACCU-CHEK VIOLETTA PLUS) test strip  citalopram (CELEXA) 40 MG tablet  cyanocobalamin (VITAMIN B-12) 1000 MCG sublingual  "tablet  HYDROcodone-acetaminophen (NORCO) 5-325 MG tablet  lisinopril (ZESTRIL) 20 MG tablet  metFORMIN (GLUCOPHAGE) 500 MG tablet  oxybutynin ER (DITROPAN XL) 10 MG 24 hr tablet  polyethylene glycol (MIRALAX) 17 g packet  potassium chloride ER (KLOR-CON M) 10 MEQ CR tablet  senna-docusate (SENOKOT-S/PERICOLACE) 8.6-50 MG tablet  WARFARIN SODIUM PO         ALLERGIES:     Allergies   Allergen Reactions    Penicillins Shortness Of Breath and Hives       FAMILY HISTORY:     Family History   Problem Relation Age of Onset    Diabetes Type 2  Mother     Coronary Artery Disease Father 44.00    Diabetes Type 2  Brother        SOCIAL HISTORY:     Social History     Socioeconomic History    Marital status:    Tobacco Use    Smoking status: Former    Smokeless tobacco: Never     Social Drivers of Health      Received from [employer]   Interpersonal Safety: Unknown (2/7/2024)    Received from [facility]    Humiliation, Afraid, Rape, and Kick questionnaire     Physically Abused: No     Sexually Abused: No       VITALS:   BP (!) 158/73   Pulse 80   Temp 98.3  F (36.8  C) (Oral)   Resp 18   Ht 1.575 m (5' 2\")   Wt 72.6 kg (160 lb)   SpO2 99%   BMI 29.26 kg/m      PHYSICAL EXAM     Physical Exam  Vitals and nursing note reviewed. Exam conducted with a chaperone present.   Constitutional:       Appearance: Normal appearance. She is normal weight.   HENT:      Head:        Comments: Contusion.  Neurological:      Mental Status: She is alert.         Physical Exam   Constitutional: No acute distress.    Head: Atraumatic.     Nose: Nose normal.     Mouth/Throat: Oropharynx is clear and moist.     Eyes: EOM are normal. Pupils are equal, round, and reactive to light. No hyphaema or hemotympanum.     Ears: Bilateral pearly white tympanic membranes.    Neck: Normal range of motion. Neck supple.     Cardiovascular: Normal rate, regular rhythm and normal heart sounds. "  2+ femoral pulses/radial/DP pulses B    Pulmonary/Chest: Normal effort  and breath sounds normal.     Abdominal: soft nontender.    Musculoskeletal: Normal range of motion.     Neurological: Moves upper and lower extremities equally. GCS 15    Lymphatics: no edema, no calves pain, no palpable cords.    : NA    Skin: Skin is warm and dry. Contusion to left cheek.     Psychiatric: Normal mood and affect. Behavior is normal.       LAB:     All pertinent labs reviewed and interpreted.  Labs Ordered and Resulted from Time of ED Arrival to Time of ED Departure   INR - Abnormal       Result Value    INR 1.94 (*)         RADIOLOGY:     Reviewed all pertinent imaging. Please see official radiology report.  CT Facial Bones without Contrast   Final Result   IMPRESSION:   FACIAL BONE CT:   1.  No facial bone or mandibular fracture.      HEAD CT:   1.  No CT evidence for acute intracranial process.   2.  Chronic findings as detailed above.      CT Cervical Spine w/o Contrast   Final Result   IMPRESSION:   1.  No fracture or subluxation of the cervical spine.   2.  Advanced multilevel cervical spondylosis, not significantly changed since 02/07/2024.      CT Head w/o Contrast   Final Result   IMPRESSION:   FACIAL BONE CT:   1.  No facial bone or mandibular fracture.      HEAD CT:   1.  No CT evidence for acute intracranial process.   2.  Chronic findings as detailed above.           EKG:       I have independently reviewed and interpreted the EKG(s) documented above.      PROCEDURES:     Procedures      I, Clover Crews, am serving as a scribe to document services personally performed by Dr. Borja based on my observation and the provider's statements to me. I, Angie Borja MD attest that Clover Crews is acting in a scribe capacity, has observed my performance of the services and has documented them in accordance with my direction.    Angie Borja M.D.  Emergency Medicine  [facility]  [facility] EMERGENCY DEPARTMENT  [address] [place] [id number]  [phone]  Dept: [phone]       Angie Borja MD  12/13/24 2563

## 2024-12-13 NOTE — DISCHARGE INSTRUCTIONS
Read and follow discharge instructions.    The CT of your head face and neck did not show any broken bones or any bleeding.    Your INR is slightly low at 1.94.  Make sure you continue taking it as you supposed to.    Follow-up with your primary care doctor this upcoming week    Return for any concerns.

## 2024-12-13 NOTE — ED TRIAGE NOTES
Patient presents to ED with Caregiver after fall when she was out at restaurant.  Pt was in handicap stall in bathroom, was trying to open door and loss her balance.  Fell onto floor, note abrasions to left side of face.  Pt denies LOC.  Pt takes Warfarin, INR yesterday 1.9.  pt is able to move all extremities, transferred out of vehicle with SBA, into wheelchair.  Trauma alert called.

## 2024-12-13 NOTE — ED NOTES
Per daughter in law last  Tetanus vaccine was January 2024.  Dr. Borja informed about this.  Left side of face abrasion cleansed and bacitracin was applied then covered with band aid.  Pt was able to walk to restroom using her walker with 1 stand by assist

## 2025-02-19 ENCOUNTER — LAB REQUISITION (OUTPATIENT)
Dept: LAB | Facility: CLINIC | Age: 75
End: 2025-02-19
Payer: COMMERCIAL

## 2025-02-19 DIAGNOSIS — E11.9 TYPE 2 DIABETES MELLITUS WITHOUT COMPLICATIONS (H): ICD-10-CM

## 2025-02-19 PROCEDURE — 80061 LIPID PANEL: CPT | Mod: ORL | Performed by: PHYSICIAN ASSISTANT

## 2025-02-20 LAB
CHOLEST SERPL-MCNC: 136 MG/DL
FASTING STATUS PATIENT QL REPORTED: NORMAL
HDLC SERPL-MCNC: 73 MG/DL
LDLC SERPL CALC-MCNC: 50 MG/DL
NONHDLC SERPL-MCNC: 63 MG/DL
TRIGL SERPL-MCNC: 66 MG/DL

## 2025-06-26 ENCOUNTER — LAB REQUISITION (OUTPATIENT)
Dept: LAB | Facility: CLINIC | Age: 75
End: 2025-06-26
Payer: COMMERCIAL

## 2025-06-26 DIAGNOSIS — I12.9 HYPERTENSIVE CHRONIC KIDNEY DISEASE WITH STAGE 1 THROUGH STAGE 4 CHRONIC KIDNEY DISEASE, OR UNSPECIFIED CHRONIC KIDNEY DISEASE: ICD-10-CM

## 2025-06-26 PROCEDURE — 80053 COMPREHEN METABOLIC PANEL: CPT | Mod: ORL | Performed by: NURSE PRACTITIONER

## 2025-06-27 LAB
ALBUMIN SERPL BCG-MCNC: 3.9 G/DL (ref 3.5–5.2)
ALP SERPL-CCNC: 44 U/L (ref 40–150)
ALT SERPL W P-5'-P-CCNC: 18 U/L (ref 0–50)
ANION GAP SERPL CALCULATED.3IONS-SCNC: 14 MMOL/L (ref 7–15)
AST SERPL W P-5'-P-CCNC: 22 U/L (ref 0–45)
BILIRUB SERPL-MCNC: 0.7 MG/DL
BUN SERPL-MCNC: 18.3 MG/DL (ref 8–23)
CALCIUM SERPL-MCNC: 9.8 MG/DL (ref 8.8–10.4)
CHLORIDE SERPL-SCNC: 102 MMOL/L (ref 98–107)
CREAT SERPL-MCNC: 0.95 MG/DL (ref 0.51–0.95)
EGFRCR SERPLBLD CKD-EPI 2021: 62 ML/MIN/1.73M2
GLUCOSE SERPL-MCNC: 133 MG/DL (ref 70–99)
HCO3 SERPL-SCNC: 23 MMOL/L (ref 22–29)
POTASSIUM SERPL-SCNC: 5 MMOL/L (ref 3.4–5.3)
PROT SERPL-MCNC: 6.7 G/DL (ref 6.4–8.3)
SODIUM SERPL-SCNC: 139 MMOL/L (ref 135–145)